# Patient Record
Sex: FEMALE | Race: WHITE | NOT HISPANIC OR LATINO | Employment: FULL TIME | URBAN - METROPOLITAN AREA
[De-identification: names, ages, dates, MRNs, and addresses within clinical notes are randomized per-mention and may not be internally consistent; named-entity substitution may affect disease eponyms.]

---

## 2017-01-03 ENCOUNTER — HOSPITAL ENCOUNTER (OUTPATIENT)
Dept: MRI IMAGING | Facility: HOSPITAL | Age: 47
Discharge: HOME | End: 2017-01-03
Admitting: ORTHOPAEDIC SURGERY

## 2017-01-03 DIAGNOSIS — M25.562 LEFT KNEE PAIN, UNSPECIFIED CHRONICITY: ICD-10-CM

## 2017-01-03 PROCEDURE — 73721 MRI JNT OF LWR EXTRE W/O DYE: CPT

## 2017-01-18 ENCOUNTER — TELEPHONE (OUTPATIENT)
Dept: OBSTETRICS AND GYNECOLOGY | Facility: CLINIC | Age: 47
End: 2017-01-18

## 2017-01-18 RX ORDER — VALACYCLOVIR HYDROCHLORIDE 500 MG/1
500 TABLET, FILM COATED ORAL 2 TIMES DAILY
Qty: 6 TABLET | Refills: 5 | Status: SHIPPED | OUTPATIENT
Start: 2017-01-18 | End: 2019-12-19

## 2017-01-18 NOTE — TELEPHONE ENCOUNTER
----- Message from Jodi Finch sent at 1/18/2017 10:24 AM EST -----  Regarding: NEEDS REFILL  Contact: [phone]  [facility]    NEED REFILL    [facility]  DOWNSTAIRS    NOT PREGNANT      Pt advised of rx called in

## 2017-05-30 ENCOUNTER — APPOINTMENT (OUTPATIENT)
Dept: MAMMOGRAPHY | Facility: HOSPITAL | Age: 47
End: 2017-05-30
Attending: INTERNAL MEDICINE

## 2017-06-02 ENCOUNTER — APPOINTMENT (OUTPATIENT)
Dept: MAMMOGRAPHY | Facility: HOSPITAL | Age: 47
End: 2017-06-02
Attending: INTERNAL MEDICINE

## 2017-06-06 ENCOUNTER — APPOINTMENT (OUTPATIENT)
Dept: MAMMOGRAPHY | Facility: HOSPITAL | Age: 47
End: 2017-06-06
Attending: INTERNAL MEDICINE

## 2017-06-15 ENCOUNTER — TRANSCRIBE ORDERS (OUTPATIENT)
Dept: MAMMOGRAPHY | Facility: HOSPITAL | Age: 47
End: 2017-06-15

## 2017-06-15 ENCOUNTER — HOSPITAL ENCOUNTER (OUTPATIENT)
Dept: MAMMOGRAPHY | Facility: HOSPITAL | Age: 47
Discharge: HOME | End: 2017-06-15
Attending: INTERNAL MEDICINE | Admitting: INTERNAL MEDICINE

## 2017-06-15 DIAGNOSIS — R92.8 ABNORMAL MAMMOGRAM: ICD-10-CM

## 2017-06-15 DIAGNOSIS — R92.8 ABNORMAL MAMMOGRAM: Primary | ICD-10-CM

## 2017-06-15 PROCEDURE — 77065 DX MAMMO INCL CAD UNI: CPT | Performed by: RADIOLOGY

## 2017-06-15 PROCEDURE — G0206 DX MAMMO INCL CAD UNI: HCPCS

## 2017-12-18 ENCOUNTER — HOSPITAL ENCOUNTER (OUTPATIENT)
Dept: MAMMOGRAPHY | Facility: HOSPITAL | Age: 47
Discharge: HOME | End: 2017-12-18
Attending: INTERNAL MEDICINE | Admitting: INTERNAL MEDICINE

## 2017-12-18 DIAGNOSIS — R92.8 ABNORMAL MAMMOGRAM: ICD-10-CM

## 2017-12-18 PROCEDURE — 77066 DX MAMMO INCL CAD BI: CPT | Performed by: RADIOLOGY

## 2017-12-18 PROCEDURE — 77062 BREAST TOMOSYNTHESIS BI: CPT | Performed by: RADIOLOGY

## 2017-12-18 PROCEDURE — G0279 TOMOSYNTHESIS, MAMMO: HCPCS

## 2017-12-18 PROCEDURE — G0204 DX MAMMO INCL CAD BI: HCPCS

## 2019-04-16 PROBLEM — F32.1 CURRENT MODERATE EPISODE OF MAJOR DEPRESSIVE DISORDER WITHOUT PRIOR EPISODE: Status: ACTIVE | Noted: 2019-04-16

## 2019-04-16 PROBLEM — M50.90 DISC DISORDER OF CERVICAL REGION: Status: ACTIVE | Noted: 2019-04-16

## 2019-04-16 PROBLEM — Z78.0 MENOPAUSE: Status: ACTIVE | Noted: 2019-04-16

## 2019-04-16 PROBLEM — H92.01 EAR PAIN, RIGHT: Status: ACTIVE | Noted: 2019-04-16

## 2019-04-16 PROBLEM — M25.551 BILATERAL HIP PAIN: Status: ACTIVE | Noted: 2019-04-16

## 2019-04-16 PROBLEM — H61.21 IMPACTED CERUMEN OF RIGHT EAR: Status: ACTIVE | Noted: 2019-04-16

## 2019-04-16 PROBLEM — Z00.00 ANNUAL PHYSICAL EXAM: Status: ACTIVE | Noted: 2019-04-16

## 2019-04-16 PROBLEM — M54.42 ACUTE LEFT-SIDED LOW BACK PAIN WITH LEFT-SIDED SCIATICA: Status: ACTIVE | Noted: 2019-04-16

## 2019-04-16 PROBLEM — M25.552 BILATERAL HIP PAIN: Status: ACTIVE | Noted: 2019-04-16

## 2019-04-16 PROBLEM — E66.9 SIMPLE OBESITY: Status: ACTIVE | Noted: 2019-04-16

## 2019-04-16 PROBLEM — B02.9 HERPES ZOSTER WITHOUT COMPLICATION: Status: ACTIVE | Noted: 2019-04-16

## 2019-04-16 PROBLEM — J30.9 ALLERGIC RHINITIS: Status: ACTIVE | Noted: 2019-04-16

## 2019-04-25 ENCOUNTER — OFFICE VISIT (OUTPATIENT)
Dept: INTERNAL MEDICINE | Facility: CLINIC | Age: 49
End: 2019-04-25

## 2019-04-25 VITALS
HEIGHT: 64 IN | WEIGHT: 210.5 LBS | HEART RATE: 68 BPM | BODY MASS INDEX: 35.94 KG/M2 | DIASTOLIC BLOOD PRESSURE: 88 MMHG | SYSTOLIC BLOOD PRESSURE: 128 MMHG

## 2019-04-25 DIAGNOSIS — F32.5 MAJOR DEPRESSIVE DISORDER WITH SINGLE EPISODE, IN FULL REMISSION (HCC): Primary | ICD-10-CM

## 2019-04-25 DIAGNOSIS — E66.9 SIMPLE OBESITY: ICD-10-CM

## 2019-04-25 PROCEDURE — 99213 OFFICE O/P EST LOW 20 MIN: CPT | Performed by: INTERNAL MEDICINE

## 2019-04-25 NOTE — PROGRESS NOTES
[facility]     Blanka Carter  1970   [id number]      Patient Care Team:  Slava Hair MD as PCP - General  Slava Hair MD as PCP - Family Medicine    Chief Complaint::   Chief Complaint   Patient presents with   • Depression   • Allergic Rhinitis   • Back Pain   • Menopause        HPI  Mrs. Carter comes in for follow-up of her depression, back pain and obesity.  Her back pain is generally resolved.  She is back to the gym lifting weights and doing cardio.  Her depression is also largely resolved.  She is now taking Pristiq every other day.  She has long since been off escitalopram.  Her only other complaint is numbness in her hands when she wakes up in the morning.  She thinks it may be the way she sleeps because by 11 AM the symptoms have resolved completely.    Chronic Conditions:      Patient Active Problem List   Diagnosis   • Herpes zoster without complication   • Acute left-sided low back pain with left-sided sciatica   • Disc disorder of cervical region   • Simple obesity   • Menopause   • Impacted cerumen of right ear   • Allergic rhinitis   • Ear pain, right   • Annual physical exam   • Bilateral hip pain   • Current moderate episode of major depressive disorder without prior episode (CMS/HCC)        Past Medical History:   Diagnosis Date   • Cervical spondylosis    • HSV infection    • Seizure disorder (CMS/HCC)     resolved       Past Surgical History:   Procedure Laterality Date   •  SECTION     • HYSTERECTOMY      LSH   • PARTIAL HYSTERECTOMY     • TUBAL ABDOMINAL LIGATION         Family History   Problem Relation Age of Onset   • Diabetes Father         type 2   • Osteoarthritis Mother    • Breast cancer Neg Hx    • Ovarian cancer Neg Hx        Social History     Socioeconomic History   • Marital status:      Spouse name: Not on file   • Number of children: Not on file   • Years of education: Not on file   • Highest education level: Not on  file   Tobacco Use   • Smoking status: Former Smoker     Years: 14.00     Types: Cigarettes     Last attempt to quit: 2012     Years since quittin.3   • Tobacco comment: 2 cigarettes per day       No Known Allergies      Current Outpatient Medications:   •  acyclovir (ZOVIRAX) 400 MG tablet, Take 1 tablet by mouth 2 (Two) Times a Day., Disp: 60 tablet, Rfl: 5  •  cetirizine-pseudoephedrine (ZyrTEC-D) 5-120 MG per 12 hr tablet, Take 1 tablet by mouth Every 12 (Twelve) Hours., Disp: 60 tablet, Rfl: 3  •  cyclobenzaprine (FLEXERIL) 5 MG tablet, Take 1-2 tablets by mouth every night at bedtime., Disp: 30 tablet, Rfl: 1  •  desvenlafaxine (PRISTIQ) 50 MG 24 hr tablet, Take 1 tablet by mouth every other day, Disp: 15 tablet, Rfl: 3  •  albuterol (PROVENTIL HFA;VENTOLIN HFA) 108 (90 Base) MCG/ACT inhaler, Inhale 1-2 puffs Every 6 (Six) Hours As Needed., Disp: 18 g, Rfl: 0  •  hepatitis A (HAVRIX) 1440 EL U/ML vaccine, Inject into the appropriate muscle as directed by prescriber. Repeat in 6 months., Disp: 1 mL, Rfl: 1  •  valACYclovir (VALTREX) 500 MG tablet, Take 1 tablet by mouth 2 (Two) Times a Day., Disp: 6 tablet, Rfl: 5  •  valACYclovir (VALTREX) 500 MG tablet, Take 1 tablet by mouth 2 (Two) Times a Day., Disp: 20 tablet, Rfl: 0    Review of Systems   Constitutional: Negative for chills, fatigue and fever.   HENT: Negative for congestion, ear pain and sinus pressure.    Respiratory: Negative for cough, chest tightness, shortness of breath and wheezing.    Cardiovascular: Negative for chest pain and palpitations.   Gastrointestinal: Negative for abdominal pain, blood in stool and constipation.   Skin: Negative for color change.   Allergic/Immunologic: Negative for environmental allergies.   Neurological: Negative for dizziness, speech difficulty and headache.   Psychiatric/Behavioral: Negative for decreased concentration. The patient is not nervous/anxious.         Vital Signs  Vitals:    19 0853   BP:  "128/88   BP Location: Right arm   Patient Position: Sitting   Cuff Size: Adult   Pulse: 68   Weight: 95.5 kg (210 lb 8 oz)   Height: 162.6 cm (64.02\")       Physical Exam   Constitutional: She is oriented to person, place, and time. She appears well-developed and well-nourished.   HENT:   Head: Normocephalic and atraumatic.   Cardiovascular: Normal rate, regular rhythm and normal heart sounds.   No murmur heard.  Pulmonary/Chest: Effort normal and breath sounds normal.   Neurological: She is alert and oriented to person, place, and time.   Psychiatric: She has a normal mood and affect.   Vitals reviewed.     Procedures    ACE III MINI             Assessment/Plan:    Blanka was seen today for depression, allergic rhinitis, back pain and menopause.    Diagnoses and all orders for this visit:    Major depressive disorder with single episode, in full remission (CMS/HCC)    Simple obesity    She will further taper Pristiq by reducing frequency to every third day.  After 2 to 3 weeks, if there is no evidence of recurrence, suggest she discontinue the drug completely at that point.    Discussed the importance of reducing carbohydrates and calories in her diet and to continue exercise.      Plan of care reviewed with patient at the conclusion of today's visit. Education was provided regarding diagnosis, management, and any prescribed or recommended OTC medications.Patient verbalizes understanding of and agreement with management plan.         Slava Hair MD           "

## 2019-06-17 RX ORDER — CETIRIZINE HYDROCHLORIDE, PSEUDOEPHEDRINE HYDROCHLORIDE 5; 120 MG/1; MG/1
1 TABLET, FILM COATED, EXTENDED RELEASE ORAL EVERY 12 HOURS
Qty: 60 TABLET | Refills: 3 | Status: SHIPPED | OUTPATIENT
Start: 2019-06-17 | End: 2020-11-11

## 2019-07-05 ENCOUNTER — TELEPHONE (OUTPATIENT)
Dept: INTERNAL MEDICINE | Facility: CLINIC | Age: 49
End: 2019-07-05

## 2019-07-25 ENCOUNTER — OFFICE VISIT (OUTPATIENT)
Dept: INTERNAL MEDICINE | Facility: CLINIC | Age: 49
End: 2019-07-25

## 2019-07-25 VITALS — SYSTOLIC BLOOD PRESSURE: 122 MMHG | HEART RATE: 76 BPM | DIASTOLIC BLOOD PRESSURE: 80 MMHG

## 2019-07-25 DIAGNOSIS — M79.672 PAIN OF BOTH HEELS: Primary | ICD-10-CM

## 2019-07-25 DIAGNOSIS — M79.671 PAIN OF BOTH HEELS: Primary | ICD-10-CM

## 2019-07-25 PROCEDURE — 99213 OFFICE O/P EST LOW 20 MIN: CPT | Performed by: PHYSICIAN ASSISTANT

## 2019-07-25 NOTE — PROGRESS NOTES
Patient Care Team:  Slava Hair MD as PCP - General  Slava Hair MD as PCP - Family Medicine    Chief Complaint;:   Chief Complaint   Patient presents with   • Foot Pain     bilateral but mosty R        Subjective     HPI  49-year-old white female presents to the office today with bilateral heel pain the right is much worse than the left.  She has a prior history of left foot plantar fasciitis but this pain is more localized in the heel area.  She is on her feet all day.  She does wear shoe inserts.  She does do her stretching exercises.  She takes Aleve which does not give her much relief.  She is tried icing which makes symptoms worse.  Past Medical History:   Diagnosis Date   • Cervical spondylosis    • HSV infection    • Seizure disorder (CMS/[facility])     resolved       Social History     Socioeconomic History   • Marital status:      Spouse name: Not on file   • Number of children: Not on file   • Years of education: Not on file   • Highest education level: Not on file   Tobacco Use   • Smoking status: Former Smoker     Years: 14.00     Types: Cigarettes     Last attempt to quit: 2012     Years since quittin.5   • Tobacco comment: 2 cigarettes per day       No Known Allergies    Review of Systems:     Review of Systems   Musculoskeletal:        Bilateral heel pain       Vital Signs  Vitals:    19 1513   BP: 122/80   BP Location: Left arm   Patient Position: Sitting   Cuff Size: Adult   Pulse: 76   Weight: Comment: patient refused   PainSc:   4   PainLoc: Foot         Current Outpatient Medications:   •  acyclovir (ZOVIRAX) 400 MG tablet, Take 1 tablet by mouth 2 (Two) Times a Day., Disp: 60 tablet, Rfl: 5  •  cetirizine-pseudoephedrine (ZyrTEC-D) 5-120 MG per 12 hr tablet, Take 1 tablet by mouth Every 12 (Twelve) Hours., Disp: 60 tablet, Rfl: 3  •  cyclobenzaprine (FLEXERIL) 5 MG tablet, Take 1-2 tablets by mouth every night at bedtime., Disp: 30 tablet, Rfl: 1  •   albuterol (PROVENTIL HFA;VENTOLIN HFA) 108 (90 Base) MCG/ACT inhaler, Inhale 1-2 puffs Every 6 (Six) Hours As Needed., Disp: 18 g, Rfl: 0  •  desvenlafaxine (PRISTIQ) 50 MG 24 hr tablet, Take 1 tablet by mouth every other day, Disp: 15 tablet, Rfl: 3  •  valACYclovir (VALTREX) 500 MG tablet, Take 1 tablet by mouth 2 (Two) Times a Day., Disp: 6 tablet, Rfl: 5  •  valACYclovir (VALTREX) 500 MG tablet, Take 1 tablet by mouth 2 (Two) Times a Day., Disp: 20 tablet, Rfl: 0    Physical Exam:    Physical Exam   Constitutional: She is oriented to person, place, and time. She appears well-developed and well-nourished.   Musculoskeletal:   Right heel tenderness no redness or swelling.   Neurological: She is alert and oriented to person, place, and time.   Nursing note and vitals reviewed.      Procedures      Assessment/Plan   Problem List Items Addressed This Visit     None      Visit Diagnoses     Pain of both heels    -  Primary    Referred to Dr. Joshua Solares    Relevant Orders    Ambulatory Referral to Podiatry (Completed)        Patient Instructions   Continue foot stretches.  Continue Aleve as needed.  Refer to podiatry.      Plan of care reviewed with patient at the conclusion of today's visit. Education was provided regarding diagnosis, management, and any prescribed or recommended OTC medications.Patient verbalizes understanding of and agreement with management plan.     Maylin Perez PA-C

## 2019-09-30 ENCOUNTER — OFFICE VISIT (OUTPATIENT)
Dept: OBSTETRICS AND GYNECOLOGY | Facility: CLINIC | Age: 49
End: 2019-09-30

## 2019-09-30 VITALS
WEIGHT: 218 LBS | SYSTOLIC BLOOD PRESSURE: 120 MMHG | DIASTOLIC BLOOD PRESSURE: 78 MMHG | HEIGHT: 64 IN | BODY MASS INDEX: 37.22 KG/M2

## 2019-09-30 DIAGNOSIS — Z71.89 COUNSELING FOR HORMONE REPLACEMENT THERAPY: ICD-10-CM

## 2019-09-30 DIAGNOSIS — E66.9 SIMPLE OBESITY: ICD-10-CM

## 2019-09-30 DIAGNOSIS — Z12.39 BREAST SCREENING: ICD-10-CM

## 2019-09-30 DIAGNOSIS — N95.1 MENOPAUSAL SYMPTOMS: ICD-10-CM

## 2019-09-30 DIAGNOSIS — Z01.419 WOMEN'S ANNUAL ROUTINE GYNECOLOGICAL EXAMINATION: Primary | ICD-10-CM

## 2019-09-30 PROCEDURE — 99386 PREV VISIT NEW AGE 40-64: CPT | Performed by: OBSTETRICS & GYNECOLOGY

## 2019-09-30 PROCEDURE — 99213 OFFICE O/P EST LOW 20 MIN: CPT | Performed by: OBSTETRICS & GYNECOLOGY

## 2019-09-30 NOTE — PROGRESS NOTES
Subjective     Chief Complaint   Patient presents with   • Gynecologic Exam     new patient established last seen  thinks she starting menopause complaints of night sweats weight gain irritability and loss of motivation duration 2 years       Blanka Carter is a 49 y.o. year old  presenting to be seen for annual exam and a problem. She reports troublesome menopausal symtpoms having had a hysterectomy about 5 years ago with ovarian preservation.      History of Present Illness  Location:  Hot flushing over entire body. Some vaginal symptoms as well  Signs/Symptoms:  Hot flushing with sweating. Vaginal dryness and pain eith intercourse leading to the avoidance of sexual intimacy  Duration:  Several years.  Severity:  Moderate to severe  Timing:  Almost daily/nightly. Frequently awaked twice. Difficulty falling back asleep;  Modifying Factors:  She is unaware of mitigating factors  She is chronically tired which she attributes to lack of good quality sleep. She has loss of interest in some normal activities. She has gained weight and not motivated to exercise. She is irritable. SSRI like medication was tried and failed to achieve any goals with mood.  She is interested in ERT  We discussed the short and long term risk of ERT sep DVT and Breast Cancer. We discussed the history of the prescribing of ERT and the anticipated therapeutic goals of ERT in the context of a prior hysterectomy. We discussed other health benefits besides the alleviation of vasomotor symptoms. We discussed the anticipated time course of response to the medication. We discussed methods of delivery of estrogen and my favoring initially the use of oral CEE.  Questions were answered. She has no contraindication to the use of Estrogens.    Past Medical History:   Diagnosis Date   • Cervical spondylosis    • HSV infection    • Seizure disorder (CMS/[facility])     resolved      Family History   Problem Relation Age of Onset   • Diabetes Father          type 2   • Osteoarthritis Mother    • Breast cancer Neg Hx    • Ovarian cancer Neg Hx       Social History     Socioeconomic History   • Marital status:      Spouse name: Not on file   • Number of children: Not on file   • Years of education: Not on file   • Highest education level: Not on file   Tobacco Use   • Smoking status: Former Smoker     Years: 14.00     Types: Cigarettes     Last attempt to quit: 2012     Years since quittin.7   • Tobacco comment: 2 cigarettes per day   Substance and Sexual Activity   • Alcohol use: Yes   • Drug use: No   • Sexual activity: Yes     Partners: Male        Her LMP was Patient's last menstrual period was 2013..  Her cycles are absent.     She is sexually active.  In the past 12 months there have not been new sexual partners.  Condoms are not typically used.  She would not like to be screened for STD's at today's exam.     Current contraceptive methods being used: status post hysterectomy.      She exercises regularly: no.  She wears her seat belt: yes.  She has concerns about domestic violence: no.    GYN screening history:  · Last pap: she reports her last PAP was normal  · Last mammogram: she reports her last mammogram was normal  · Last colonoscopy: diverticulosis, 2019  · Last DEXA: she has never had a DEXA.    Additional Complaints:  no other complaints at this time    The following portions of the patient's history were reviewed and updated as appropriate:  .    Review of Systems  A 14 point review of systems was negative except for: Constitutional: positive for weight gain  Eyes: positive for none  Ears, nose, mouth, throat, and face: positive for none  Respiratory: positive for none  Cardiovascular: positive for none  Gastrointestinal: positive for none  Genitourinary: positive for vaginal dryness painful intercourse  Integument/breast: positive for some breast tenderness at bra line  Hematologic/lymphatic: positive for none  Musculoskeletal:  "positive for none  Neurological: positive for none  Behavioral/Psych: positive for bad mood, obesity, sexual difficulty and sleep disturbance  Endocrine: positive for none  Allergic/Immunologic: positive for none    Objective   /78   Ht 162.6 cm (64\")   Wt 98.9 kg (218 lb)   LMP 12/01/2013 Comment: hysterectomy  Breastfeeding? No   BMI 37.42 kg/m²   Physical Exam  General:  obese - Body mass index is 37.42 kg/m².   Constitutional: obese and healthy   Skin:  No suspicious lesions seen   Thyroid: normal to inspection and palpation   Lungs:  breathing is unlabored  clear to auscultation bilaterally   Heart:  regular rate and rhythm, S1, S2 normal, no murmur, click, rub or gallop   Breasts:  Examined in supine position  Symmetric without masses or skin dimpling  Nipples normal without inversion, lesions or discharge  There are no palpable axillary nodes   Abdomen: soft, non-tender; no masses  no umbilical or inginual hernias are present  no hepato-splenomegaly   Pelvis: Clinical staff was present for exam  External genitalia:  normal appearance of the external genitalia including Bartholin's and Bellevue's glands.  :  urethral meatus normal; urethral hypermobility is absent.  Vaginal:  normal pink mucosa without prolapse or lesions.Atrophic changes noted  Cervix:  normal appearance pap done  Uterus:  absent  Adnexa:  normal bimanual exam of the adnexa.   Musculoskeletal: negative   Neuro: normal without focal findings, mental status, speech normal, alert and oriented x3 and LOBO   Psych: oriented to time, place and person, mood and affect are within normal limits, pt is a good historian; no memory problems were noted       Lab Review   No data reviewed    Imaging  Mammogram report    Assessment/Plan   Diagnosis:  1. Women's annual routine gynecological examination    2. Simple obesity   Discussed weight loss strategies   3. Menopausal symptoms   In addition to the annual GYN exam an additional 45 mins  Face " to face encounter was used with 40 mions devoted to the management of menopausal symptoms   She is interested in ERT  We discussed the short and long term risk of ERT sep DVT and Breast Cancer. We discussed the history of the prescribing of ERT and the anticipated therapeutic goals of ERT in the context of a prior hysterectomy. We discussed other health benefits besides the alleviation of vasomotor symptoms. We discussed the anticipated time course of response to the medication. We discussed methods of delivery of estrogen and my favoring initially the use of oral CEE.  Questions were answered. She has no contraindication to the use of Estrogens.   4. Counseling for hormone replacement therapy    5. Breast screening   Mamm to be scheduled  SBE re-inforced       Orders Placed This Encounter   Procedures   • Mammo Screening Digital Tomosynthesis Bilateral With CAD     Standing Status:   Future     Standing Expiration Date:   9/29/2020     Order Specific Question:   Reason for Exam:     Answer:   screen     New Medications Ordered This Visit   Medications   • estrogens, conjugated, (PREMARIN) 0.625 MG tablet     Sig: Take 1 tablet by mouth Daily.     Dispense:  30 tablet     Refill:  1   • estrogens, conjugated, (PREMARIN) 0.625 MG tablet     Sig: Take 1 tablet by mouth Daily.     Dispense:  5 tablet     Refill:  0       Follow up: 4 week(s)         This note was electronically signed.    Jack Rodney MD  September 30, 2019

## 2019-11-25 ENCOUNTER — OFFICE VISIT (OUTPATIENT)
Dept: OBSTETRICS AND GYNECOLOGY | Facility: CLINIC | Age: 49
End: 2019-11-25

## 2019-11-25 DIAGNOSIS — Z71.89 COUNSELING FOR HORMONE REPLACEMENT THERAPY: Primary | ICD-10-CM

## 2019-12-19 ENCOUNTER — OFFICE VISIT (OUTPATIENT)
Dept: OBSTETRICS AND GYNECOLOGY | Facility: CLINIC | Age: 49
End: 2019-12-19

## 2019-12-19 VITALS
WEIGHT: 222 LBS | HEIGHT: 64 IN | SYSTOLIC BLOOD PRESSURE: 120 MMHG | BODY MASS INDEX: 37.9 KG/M2 | DIASTOLIC BLOOD PRESSURE: 66 MMHG

## 2019-12-19 DIAGNOSIS — Z71.89 COUNSELING FOR HORMONE REPLACEMENT THERAPY: Primary | ICD-10-CM

## 2019-12-19 DIAGNOSIS — N95.1 MENOPAUSAL SYMPTOMS: ICD-10-CM

## 2019-12-19 PROCEDURE — 99213 OFFICE O/P EST LOW 20 MIN: CPT | Performed by: OBSTETRICS & GYNECOLOGY

## 2019-12-19 NOTE — PROGRESS NOTES
Chief complaint:   Follow-up  History of present illness:  This patient is a 49-year-old female being seen in follow-up for hormone replacement therapy.  She was begun on estrogen having complained of vasomotor symptoms as well as vaginal complaints.  Having been on estrogen replacement therapy for approximately 4 to 6 weeks she reports that all hot flashes and night sweats have resolved.  She is satisfied with her treatment.  She is having no worrisome side effects from estrogen.  She is compliant with her medication.  She reports an increase in irritability which she has suffered from in the past and is not necessarily attributing the mood issues to the estrogen.  Review of systems:  14 point reviewed otherwise negative  Vital signs:  Reviewed  Physical exam:  Not performed  Impression:  Menopausal symptoms, resolved  Mood disorder  Plan:  Continue Premarin 0.625 mg daily  Follow-up for annual exam  Discuss with me or family medical doctor issues with mood if persistent

## 2020-04-07 RX ORDER — ACYCLOVIR 400 MG/1
400 TABLET ORAL 2 TIMES DAILY
Qty: 60 TABLET | Refills: 5 | Status: SHIPPED | OUTPATIENT
Start: 2020-04-07 | End: 2021-02-18 | Stop reason: ALTCHOICE

## 2020-04-07 NOTE — TELEPHONE ENCOUNTER
Electronic refill request for acyclovir.  Patient's last OV was with Maylin on 7/25/20.  No future appt scheduled.  Provider sign-off pending.

## 2020-09-11 ENCOUNTER — OFFICE VISIT (OUTPATIENT)
Dept: ORTHOPEDIC SURGERY | Facility: CLINIC | Age: 50
End: 2020-09-11

## 2020-09-11 VITALS — HEART RATE: 83 BPM | BODY MASS INDEX: 37.22 KG/M2 | HEIGHT: 64 IN | WEIGHT: 218 LBS | OXYGEN SATURATION: 97 %

## 2020-09-11 DIAGNOSIS — M72.2 PLANTAR FASCIITIS OF RIGHT FOOT: Primary | ICD-10-CM

## 2020-09-11 PROCEDURE — 99213 OFFICE O/P EST LOW 20 MIN: CPT | Performed by: PHYSICIAN ASSISTANT

## 2020-09-11 RX ORDER — IBUPROFEN 200 MG
200 TABLET ORAL EVERY 6 HOURS PRN
COMMUNITY
End: 2021-03-31

## 2020-09-11 NOTE — PROGRESS NOTES
[facility] [place] [facility] Note    Subjective     Patient: Blanka Carter  : 1970    Primary Care Provider: Slava Hair MD    Requesting Provider: As above    Pain of the Right Foot      History    Chief Complaint: Right heel pain    History of Present Illness: This is a very pleasant 50-year-old female presenting today to discuss her right heel pain.  She complains of pain for approximately 2 years.  No trauma or injury.  She has pain that is worse with weightbearing and walking with occasional shooting rest pain as well.  She it is worse first thing in the morning getting up from a seated position.  She has treated by podiatry with multiple cortisone injections, laser therapy, several rounds of physical therapy, [place] sock, orthotics with persisting pain is dysfunction.  She reports it is sharp and 3/10.  No radiating pain.  She is here for evaluation and treatment recommendations with concerns that this is something other than plantar fasciitis    Current Outpatient Medications on File Prior to Visit   Medication Sig Dispense Refill   • acyclovir (ZOVIRAX) 400 MG tablet Take 1 tablet by mouth 2 (Two) Times a Day. 60 tablet 5   • cetirizine-pseudoephedrine (ZyrTEC-D) 5-120 MG per 12 hr tablet Take 1 tablet by mouth Every 12 (Twelve) Hours. 60 tablet 3   • estrogens, conjugated, (Premarin) 0.625 MG tablet Take 1 tablet by mouth Daily. 90 tablet 2   • ibuprofen (ADVIL,MOTRIN) 200 MG tablet Take 200 mg by mouth Every 6 (Six) Hours As Needed for Mild Pain .       No current facility-administered medications on file prior to visit.       No Known Allergies   Past Medical History:   Diagnosis Date   • Cervical spondylosis    • HSV infection    • Seizure disorder (CMS/HCC)     resolved     Past Surgical History:   Procedure Laterality Date   •  SECTION     • HYSTERECTOMY      LSH   • SUBTOTAL HYSTERECTOMY     • TUBAL ABDOMINAL LIGATION       Family History   Problem  "Relation Age of Onset   • Diabetes Father         type 2   • Osteoarthritis Mother    • Breast cancer Neg Hx    • Ovarian cancer Neg Hx       Social History     Socioeconomic History   • Marital status:      Spouse name: Not on file   • Number of children: Not on file   • Years of education: Not on file   • Highest education level: Not on file   Tobacco Use   • Smoking status: Former Smoker     Years: 14.00     Types: Cigarettes     Quit date: 2012     Years since quittin.7   • Smokeless tobacco: Never Used   • Tobacco comment: 2 cigarettes per day   Substance and Sexual Activity   • Alcohol use: Yes   • Drug use: No   • Sexual activity: Yes     Partners: Male        Review of Systems   Constitutional: Positive for activity change and fatigue.   HENT: Positive for congestion and sinus pressure.    Eyes: Negative.    Respiratory: Negative.    Cardiovascular: Negative.    Gastrointestinal: Negative.    Endocrine: Negative.    Genitourinary: Negative.    Musculoskeletal: Positive for arthralgias.   Skin: Negative.    Allergic/Immunologic: Positive for environmental allergies.   Neurological: Positive for headaches.   Hematological: Negative.    Psychiatric/Behavioral: Negative.        The following portions of the patient's history were reviewed and updated as appropriate: allergies, current medications, past family history, past medical history, past social history, past surgical history and problem list.      Objective      Physical Exam  Pulse 83   Ht 162.6 cm (64.02\")   Wt 98.9 kg (218 lb)   LMP 2013 Comment: hysterectomy  SpO2 97%   BMI 37.40 kg/m²     Body mass index is 37.4 kg/m².    GENERAL: Body habitus: obese    Lower extremity edema: Left: 1+ pitting; Right: 1+ pitting    Varicose veins:  Left: mild; Right: mild    Gait: normal and antalgic with the first few steps     Mental Status:  awake and alert; oriented to person, place, and time    Voice:  clear  SKIN:  Normal    Hair " Growth:  Right:normal; Left:  normal  HEENT: Head: Normocephalic, atraumatic,  without obvious abnormality.  eye: normal external eye, no icterus   PULM:  Repiratory effort normal    Ortho Exam  V:  Dorsalis Pedis:  Right: 2+; Left:2+    Posterior Tibial: Right:2+; Left:2+    Capillary Refill:  Brisk  MSK:      Tibia:  Right:  tender over subcutaneous border; Left:  tender over subcutaneous border      Ankle:  Right: tender Very mildly tender over the insertion of the Achilles tendon, ROM  normal and motor function  normal; Left:  non tender, ROM  normal and motor function  normal      Foot:  Right:  tender Over the origin the plantar fascia and into the arch with palpable plantar fibroma in the arch; Left:  non tender      NEURO: Heel Walking:  Right:  painful; Left:  normal    Toe Walking:  Right:  normal; Left:  normal     [place]-Gilbert 5.07 monofilament test: not evaluated    Lower extremity sensation: intact     Calf Atrophy:none    Motor Function: all 5/5          Medical Decision Making    Data Review:   ordered and reviewed x-rays today and reviewed outside records    Assessment:  1. Plantar fasciitis of right foot        Plan:  Right plantar fasciitis: Patient has been treated in the past with injections, laser therapy, physical therapy and stretching as well as Strassburg sock with persisting pain and dysfunction.  I reassured her that this is plantar fasciitis.  Her x-rays show some mild hallux rigidus with no evidence of any acute bony findings.  I explained plantar fasciitis in detail to the patient.  I explained to the bow-string mechanism of the plantar fascia.  I went over the current research of the [employer] with them.  I explained the treatments that were not statistically significant in improving the problem including: injection of steroids, special orthotics, special shoes, surgery, medication, ice, not working, etc.    I explained the treatments that are  "statistically significant at improving the problem.  These include stretching/night splinting, casting, PRP.    I explained the most important treatment: Stretching and night splinting.  I went over the patient information sheet with them, I showed them the stretches and they were able to reproduce them.  I emphasized the \"toe pull\" as the most important stretch.  They need to do the stretches 5 repetitions each, 6-8 times per day.  I explained how to do them at work, at home, before getting out of bed, before getting out of the car, and before getting up from a chair.    We provided them with a night splint.    If they do not improve after 4 months of aggressive stretching and night splinting, the next step will be a short leg fiberglass walking cast worn for 6 weeks.    · Preprinted education was provided to the patient.    We discussed further treatment including casting versus stretching and night splinting.  We will get her a better night splint today.  Patient will begin stretching and night splinting return for casting if needed.    Patient history, diagnosis and treatment plan discussed with Dr. Berman.          .        Blanka Braun PA-C  09/15/20  11:31 EDT  "

## 2020-09-15 ENCOUNTER — TELEPHONE (OUTPATIENT)
Dept: ORTHOPEDIC SURGERY | Facility: CLINIC | Age: 50
End: 2020-09-15

## 2020-09-15 NOTE — TELEPHONE ENCOUNTER
Called patient back- she wants to come in to talk with Blanka about her foot. .She feels like something different might be going on.     Deyanira

## 2020-09-15 NOTE — TELEPHONE ENCOUNTER
The next step would be to be in a weightbearing cast for 6 weeks.  This is a fiberglass cast as he broke her leg but he can walk on it.  Understand, it is your right side he would be unable to drive in the cast.  Following the cast, we would not expect you to be 100% better but then you would return to stretching and night splinting.  This would also help the pain in your ankle.  We are happy to cast you at any time should you decide to do that since you have been dealing with this for so long.

## 2020-09-15 NOTE — TELEPHONE ENCOUNTER
----- Message from Blanka Carter sent at 9/15/2020 11:56 AM EDT -----  Regarding: Non-Urgent Medical Question  Contact: [phone]  Blanka,   After the 4 months of continuing stretching that I’ve already been doing for a year as well as the ice packs.. what is the next step??  When will the pain go away so that I can get back to my normal activities without having pain. And when will I be able to wear shoes other that tennis shoes with orthotics, and not have pain?  Will the pain in my ankle subside as well??    Sorry for all the questions,  Blanka

## 2020-09-23 ENCOUNTER — OFFICE VISIT (OUTPATIENT)
Dept: ORTHOPEDIC SURGERY | Facility: CLINIC | Age: 50
End: 2020-09-23

## 2020-09-23 VITALS — BODY MASS INDEX: 37.22 KG/M2 | OXYGEN SATURATION: 98 % | WEIGHT: 218.03 LBS | HEIGHT: 64 IN | HEART RATE: 77 BPM

## 2020-09-23 DIAGNOSIS — M25.50 MULTIPLE JOINT PAIN: ICD-10-CM

## 2020-09-23 DIAGNOSIS — M72.2 PLANTAR FASCIITIS OF RIGHT FOOT: ICD-10-CM

## 2020-09-23 DIAGNOSIS — M25.60 MORNING STIFFNESS OF JOINTS: Primary | ICD-10-CM

## 2020-09-23 PROCEDURE — 99213 OFFICE O/P EST LOW 20 MIN: CPT | Performed by: PHYSICIAN ASSISTANT

## 2020-09-23 NOTE — PROGRESS NOTES
[facility] [place] [facility] Note    Subjective     Patient: Blanka Carter  : 1970    Primary Care Provider: Slava Hair MD    Requesting Provider: As above    Follow-up (1.5 week f/u--right foot and ankle)      History    Chief Complaint: Right foot pain and multiple joint stiffness    History of Present Illness: Patient returns today with concerns that something more is going on than just plantar fasciitis.  She reports that for quite some time she has had stiffness in multiple joints in the morning as well is aches and pains in multiple joints.  She does report that the night splinting and stretching have improved her plantar fasciitis somewhat.  She is concerned because she has been dealing with both a plantar fasciitis for so long without any relief.  She has no known history of any inflammatory arthritis in her family.  She is here to see what other options are available for her.    Current Outpatient Medications on File Prior to Visit   Medication Sig Dispense Refill   • acyclovir (ZOVIRAX) 400 MG tablet Take 1 tablet by mouth 2 (Two) Times a Day. 60 tablet 5   • cetirizine-pseudoephedrine (ZyrTEC-D) 5-120 MG per 12 hr tablet Take 1 tablet by mouth Every 12 (Twelve) Hours. 60 tablet 3   • estrogens, conjugated, (Premarin) 0.625 MG tablet Take 1 tablet by mouth Daily. 90 tablet 2   • ibuprofen (ADVIL,MOTRIN) 200 MG tablet Take 200 mg by mouth Every 6 (Six) Hours As Needed for Mild Pain .       No current facility-administered medications on file prior to visit.       No Known Allergies   Past Medical History:   Diagnosis Date   • Cervical spondylosis    • HSV infection    • Seizure disorder (CMS/[facility])     resolved     Past Surgical History:   Procedure Laterality Date   •  SECTION     • HYSTERECTOMY      LSH   • SUBTOTAL HYSTERECTOMY     • TUBAL ABDOMINAL LIGATION       Family History   Problem Relation Age of Onset   • Diabetes Father         type 2   •  "Osteoarthritis Mother    • Breast cancer Neg Hx    • Ovarian cancer Neg Hx       Social History     Socioeconomic History   • Marital status:      Spouse name: Not on file   • Number of children: Not on file   • Years of education: Not on file   • Highest education level: Not on file   Tobacco Use   • Smoking status: Former Smoker     Years: 14.00     Types: Cigarettes     Quit date: 2012     Years since quittin.7   • Smokeless tobacco: Never Used   • Tobacco comment: 2 cigarettes per day   Substance and Sexual Activity   • Alcohol use: Yes   • Drug use: No   • Sexual activity: Yes     Partners: Male        Review of Systems   Constitutional: Negative.    HENT: Negative.    Eyes: Negative.    Respiratory: Negative.    Cardiovascular: Negative.    Gastrointestinal: Negative.    Endocrine: Negative.    Genitourinary: Negative.    Musculoskeletal: Positive for arthralgias.   Skin: Negative.    Allergic/Immunologic: Negative.    Neurological: Negative.    Hematological: Negative.    Psychiatric/Behavioral: Negative.        The following portions of the patient's history were reviewed and updated as appropriate: allergies, current medications, past family history, past medical history, past social history, past surgical history and problem list.      Objective      Physical Exam  Pulse 77   Ht 162.6 cm (64.02\")   Wt 98.9 kg (218 lb 0.6 oz)   LMP 2013 Comment: hysterectomy  SpO2 98%   BMI 37.41 kg/m²     Body mass index is 37.41 kg/m².    Patient is well developed, well nourished and in no acute distress.  Alert and oriented x 3.    Ortho Exam  V:  Dorsalis Pedis:  Right: 2+;     Posterior Tibial: Right:2+;     Capillary Refill:  Brisk  MSK:      Tibia:  Right:  non tender;    Ankle:  Right: non tender;     Foot:  Right:  tender Over the origin of the plantar fashion into the arch;       NEURO:     [place]Gil[place] 5.07 monofilament test: not evaluated    Lower extremity sensation: intact "     Calf Atrophy:none    Motor Function: all 5/5            Medical Decision Making    Data Review:   none    Assessment:  1. Morning stiffness of joints    2. Multiple joint pain    3. Plantar fasciitis of right foot        Plan:  Plantar fasciitis on the right with morning stiffness of multiple joints.  Patient reports that night splinting and stretching have mildly improved her pain over the past 2 weeks.  I explained to her that I am happy to put her in a weightbearing cast to speed the process, however, it is her right leg and she cannot drive.  She would like to continue with stretching and night splinting.  Given her multiple joint symptoms and morning stiffness, I would recommend referral to rheumatology.  I will put in that referral and she will return to see us as needed.      Blanka Braun PA-C  09/24/20  15:43 EDT

## 2020-10-21 ENCOUNTER — TRANSCRIBE ORDERS (OUTPATIENT)
Dept: INTERNAL MEDICINE | Facility: CLINIC | Age: 50
End: 2020-10-21

## 2020-10-21 DIAGNOSIS — Z12.31 VISIT FOR SCREENING MAMMOGRAM: Primary | ICD-10-CM

## 2020-11-11 ENCOUNTER — HOSPITAL ENCOUNTER (OUTPATIENT)
Dept: GENERAL RADIOLOGY | Facility: HOSPITAL | Age: 50
Discharge: HOME | End: 2020-11-11
Admitting: NURSE PRACTITIONER

## 2020-11-11 ENCOUNTER — OFFICE VISIT (OUTPATIENT)
Dept: INTERNAL MEDICINE | Facility: CLINIC | Age: 50
End: 2020-11-11

## 2020-11-11 VITALS
SYSTOLIC BLOOD PRESSURE: 122 MMHG | HEART RATE: 68 BPM | BODY MASS INDEX: 38.24 KG/M2 | DIASTOLIC BLOOD PRESSURE: 78 MMHG | HEIGHT: 64 IN | TEMPERATURE: 97.5 F | WEIGHT: 224 LBS

## 2020-11-11 DIAGNOSIS — M54.50 ACUTE RIGHT-SIDED LOW BACK PAIN WITHOUT SCIATICA: Primary | ICD-10-CM

## 2020-11-11 DIAGNOSIS — M54.50 ACUTE RIGHT-SIDED LOW BACK PAIN WITHOUT SCIATICA: ICD-10-CM

## 2020-11-11 LAB
BILIRUB BLD-MCNC: NEGATIVE MG/DL
CLARITY, POC: CLEAR
COLOR UR: YELLOW
GLUCOSE UR STRIP-MCNC: NEGATIVE MG/DL
KETONES UR QL: NEGATIVE
LEUKOCYTE EST, POC: NEGATIVE
NITRITE UR-MCNC: NEGATIVE MG/ML
PH UR: 5.5 [PH] (ref 5–8)
PROT UR STRIP-MCNC: NEGATIVE MG/DL
RBC # UR STRIP: NEGATIVE /UL
SP GR UR: 1.02 (ref 1–1.03)
UROBILINOGEN UR QL: NORMAL

## 2020-11-11 PROCEDURE — 72100 X-RAY EXAM L-S SPINE 2/3 VWS: CPT

## 2020-11-11 PROCEDURE — 99213 OFFICE O/P EST LOW 20 MIN: CPT | Performed by: NURSE PRACTITIONER

## 2020-11-11 PROCEDURE — 81003 URINALYSIS AUTO W/O SCOPE: CPT | Performed by: NURSE PRACTITIONER

## 2020-11-11 NOTE — PROGRESS NOTES
Blanka Carter  1970  [id number]  Patient Care Team:  Slava Hair MD as PCP - General  Slava Hair MD as PCP - Family Medicine    Blanka Carter is a pleasant 50 y.o. 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  Advil usually helps 400-600 BID, but not much this episode.    No urinary sx.  Kidney stone in past. No change in bm  Xray  showed some scoliotic curvature with convexity to the right  Past Medical History:   Diagnosis Date   • Cervical spondylosis    • HSV infection    • Seizure disorder (CMS/HCC)     resolved     Past Surgical History:   Procedure Laterality Date   •  SECTION     • HYSTERECTOMY      LSH   • SUBTOTAL HYSTERECTOMY     • TUBAL ABDOMINAL LIGATION       Family History   Problem Relation Age of Onset   • Diabetes Father         type 2   • Osteoarthritis Mother    • Breast cancer Neg Hx    • Ovarian cancer Neg Hx      Social History     Tobacco Use   Smoking Status Former Smoker   • Packs/day: 0.10   • Years: 14.00   • Pack years: 1.40   • Types: Cigarettes   • Start date:    • Quit date:    • Years since quittin.8   Smokeless Tobacco Never Used   Tobacco Comment    2 cigarettes per day     No Known Allergies    Current Outpatient Medications:   •  acyclovir (ZOVIRAX) 400 MG tablet, Take 1 tablet by mouth 2 (Two) Times a Day., Disp: 60 tablet, Rfl: 5  •  estrogens, conjugated, (PREMARIN) 0.625 MG tablet, Take 1 tablet by mouth Daily., Disp: 30 tablet, Rfl: 0  •  ibuprofen (ADVIL,MOTRIN) 200 MG tablet, Take 200 mg by mouth Every 6 (Six) Hours As Needed for Mild Pain ., Disp: , Rfl:     Review of Systems   Constitutional: Negative for chills, fatigue and fever.   HENT: Positive for ear pain and sinus pressure. Negative for congestion.    Respiratory: Negative for cough, chest  "tightness, shortness of breath and wheezing.    Cardiovascular: Negative for chest pain and palpitations.   Gastrointestinal: Negative for abdominal pain, blood in stool and constipation.   Skin: Negative for color change.   Allergic/Immunologic: Negative for environmental allergies.   Neurological: Negative for dizziness, speech difficulty and headache.   Psychiatric/Behavioral: Negative for decreased concentration. The patient is not nervous/anxious.      /78 (BP Location: Left arm, Patient Position: Sitting, Cuff Size: Large Adult)   Pulse 68   Temp 97.5 °F (36.4 °C) (Temporal)   Ht 162.6 cm (64.02\")   Wt 102 kg (224 lb)   LMP 12/01/2013 Comment: hysterectomy  BMI 38.43 kg/m²     Physical Exam  Vitals signs reviewed.   Constitutional:       Appearance: She is well-developed.   Pulmonary:      Effort: Pulmonary effort is normal.   Musculoskeletal:        Arms:    Skin:     General: Skin is warm and dry.   Neurological:      Mental Status: She is alert.   Psychiatric:         Behavior: Behavior normal.         Procedures    Results Review:  I reviewed the patient's new clinical results.    PHQ-9 Total Score: 1    Assessment/Plan:  Diagnoses and all orders for this visit:    1. Acute right-sided low back pain without sciatica (Primary)  -     POC Urinalysis Dipstick, Automated       There are no Patient Instructions on file for this visit.  Plan of care reviewed with patient at the conclusion of today's visit. Education was provided regarding diagnosis, management and any prescribed or recommended OTC medications.  Patient verbalizes understanding of and agreement with management plan.    Return if symptoms worsen or fail to improve.    *Note that portions of this note were completed with a voice recognition program.  Efforts were made to edit the dictation but occasionally words are transcribed.    ADRIEN Hammer          "

## 2020-11-19 ENCOUNTER — OFFICE VISIT (OUTPATIENT)
Dept: OBSTETRICS AND GYNECOLOGY | Facility: CLINIC | Age: 50
End: 2020-11-19

## 2020-11-19 VITALS
SYSTOLIC BLOOD PRESSURE: 120 MMHG | DIASTOLIC BLOOD PRESSURE: 90 MMHG | HEIGHT: 65 IN | BODY MASS INDEX: 37.32 KG/M2 | WEIGHT: 224 LBS

## 2020-11-19 DIAGNOSIS — Z01.419 WOMEN'S ANNUAL ROUTINE GYNECOLOGICAL EXAMINATION: Primary | ICD-10-CM

## 2020-11-19 DIAGNOSIS — Z71.89 COUNSELING FOR HORMONE REPLACEMENT THERAPY: ICD-10-CM

## 2020-11-19 DIAGNOSIS — N95.1 MENOPAUSAL SYMPTOMS: ICD-10-CM

## 2020-11-19 DIAGNOSIS — E66.9 OBESITY (BMI 35.0-39.9 WITHOUT COMORBIDITY): ICD-10-CM

## 2020-11-19 PROCEDURE — 99396 PREV VISIT EST AGE 40-64: CPT | Performed by: OBSTETRICS & GYNECOLOGY

## 2020-11-19 RX ORDER — ESTERIFIED ESTROGEN AND METHYLTESTOSTERONE .625; 1.25 MG/1; MG/1
1 TABLET ORAL
COMMUNITY
End: 2020-11-19 | Stop reason: SDUPTHER

## 2020-11-19 RX ORDER — ACYCLOVIR 400 MG/1
1 TABLET ORAL EVERY 8 HOURS
COMMUNITY
End: 2020-11-19 | Stop reason: SDUPTHER

## 2020-11-19 NOTE — PROGRESS NOTES
Subjective     Chief Complaint   Patient presents with   • Gynecologic Exam     Here for annual and pap       Blanka Carter is a 50 y.o. year old  presenting to be seen for her annual exam.      She is not sexually active.  In the past 12 months there have not been new sexual partners.  Condoms are not typically used.  She would not like to be screened for STD's at today's exam.     She exercises regularly: no.  She wears her seat belt: yes.  She has concerns about domestic violence: no.  She has noticed changes in height: no    GYN screening history:  · Last pap: she reports her last PAP was normal  · Last mammogram: she reports her last mammogram was normal  · Last fasting lipid profile: she reports her last lipid panel was normal  · Last 25-hydroxy vitamin D level: she does not know when her last Vitamin D level was done  · Last colonoscopy: she reports her last colonoscopy was normal  · Last DEXA: she has never had a DEXA.  Health Maintenance for Postmenopausal Women  Menopause is a normal process in which your ability to get pregnant comes to an end. This process happens slowly over many months or years, usually between the ages of 48 and 55. Menopause is complete when you have missed your menstrual periods for 12 months.  It is important to talk with your health care provider about some of the most common conditions that affect women after menopause (postmenopausal women). These include heart disease, cancer, and bone loss (osteoporosis). Adopting a healthy lifestyle and getting preventive care can help to promote your health and wellness. The actions you take can also lower your chances of developing some of these common conditions.  What should I know about menopause?  During menopause, you may get a number of symptoms, such as:  · Hot flashes. These can be moderate or severe.  · Night sweats.  · Decrease in sex drive.  · Mood swings.  · Headaches.  · Tiredness.  · Irritability.  · Memory  problems.  · Insomnia.  Choosing to treat or not to treat these symptoms is a decision that you make with your health care provider.  Do I need hormone replacement therapy?  · Hormone replacement therapy is effective in treating symptoms that are caused by menopause, such as hot flashes and night sweats.  · Hormone replacement carries certain risks, especially as you become older. If you are thinking about using estrogen or estrogen with progestin, discuss the benefits and risks with your health care provider.  What is my risk for heart disease and stroke?  The risk of heart disease, heart attack, and stroke increases as you age. One of the causes may be a change in the body's hormones during menopause. This can affect how your body uses dietary fats, triglycerides, and cholesterol. Heart attack and stroke are medical emergencies. There are many things that you can do to help prevent heart disease and stroke.  Watch your blood pressure  · High blood pressure causes heart disease and increases the risk of stroke. This is more likely to develop in people who have high blood pressure readings, are of  descent, or are overweight.  · Have your blood pressure checked:  ? Every 3-5 years if you are 18-39 years of age.  ? Every year if you are 40 years old or older.  Eat a healthy diet       · Eat a diet that includes plenty of vegetables, fruits, low-fat dairy products, and lean protein.  · Do not eat a lot of foods that are high in solid fats, added sugars, or sodium.  Get regular exercise  Get regular exercise. This is one of the most important things you can do for your health. Most adults should:  · Try to exercise for at least 150 minutes each week. The exercise should increase your heart rate and make you sweat (moderate-intensity exercise).  · Try to do strengthening exercises at least twice each week. Do these in addition to the moderate-intensity exercise.  · Spend less time sitting. Even light physical  activity can be beneficial.  Other tips  · Work with your health care provider to achieve or maintain a healthy weight.  · Do not use any products that contain nicotine or tobacco, such as cigarettes, e-cigarettes, and chewing tobacco. If you need help quitting, ask your health care provider.  · Know your numbers. Ask your health care provider to check your cholesterol and your blood sugar (glucose). Continue to have your blood tested as directed by your health care provider.  Do I need screening for cancer?  Depending on your health history and family history, you may need to have cancer screening at different stages of your life. This may include screening for:  · Breast cancer.  · Cervical cancer.  · Lung cancer.  · Colorectal cancer.  What is my risk for osteoporosis?  After menopause, you may be at increased risk for osteoporosis. Osteoporosis is a condition in which bone destruction happens more quickly than new bone creation. To help prevent osteoporosis or the bone fractures that can happen because of osteoporosis, you may take the following actions:  · If you are 19-50 years old, get at least 1,000 mg of calcium and at least 600 mg of vitamin D per day.  · If you are older than age 50 but younger than age 70, get at least 1,200 mg of calcium and at least 600 mg of vitamin D per day.  · If you are older than age 70, get at least 1,200 mg of calcium and at least 800 mg of vitamin D per day.  Smoking and drinking excessive alcohol increase the risk of osteoporosis. Eat foods that are rich in calcium and vitamin D, and do weight-bearing exercises several times each week as directed by your health care provider.  How does menopause affect my mental health?  Depression may occur at any age, but it is more common as you become older. Common symptoms of depression include:  · Low or sad mood.  · Changes in sleep patterns.  · Changes in appetite or eating patterns.  · Feeling an overall lack of motivation or  "enjoyment of activities that you previously enjoyed.  · Frequent crying spells.  Talk with your health care provider if you think that you are experiencing depression.  General instructions  See your health care provider for regular wellness exams and vaccines. This may include:  · Scheduling regular health, dental, and eye exams.  · Getting and maintaining your vaccines. These include:  ? Influenza vaccine. Get this vaccine each year before the flu season begins.  ? Pneumonia vaccine.  ? Shingles vaccine.  ? Tetanus, diphtheria, and pertussis (Tdap) booster vaccine.  Your health care provider may also recommend other immunizations.  Tell your health care provider if you have ever been abused or do not feel safe at home.  Summary  · Menopause is a normal process in which your ability to get pregnant comes to an end.  · This condition causes hot flashes, night sweats, decreased interest in sex, mood swings, headaches, or lack of sleep.  · Treatment for this condition may include hormone replacement therapy.  · Take actions to keep yourself healthy, including exercising regularly, eating a healthy diet, watching your weight, and checking your blood pressure and blood sugar levels.  · Get screened for cancer and depression. Make sure that you are up to date with all your vaccines.    No Additional Complaints Reported    The following portions of the patient's history were reviewed and updated as appropriate:vital signs, allergies, current medications, past medical history, past social history, past surgical history and problem list.    Review of Systems  Pertinent items are noted in HPI.     Physical Exam    Objective     /90   Ht 165.1 cm (65\")   Wt 102 kg (224 lb)   LMP 12/01/2013 Comment: hysterectomy  BMI 37.28 kg/m²     General:  well developed; well nourished  no acute distress  obese - Body mass index is 37.28 kg/m².   Constitutional: obese and healthy   Skin:  No suspicious lesions seen   Thyroid: " normal to inspection and palpation   Lungs:  breathing is unlabored  clear to auscultation bilaterally   Heart:  regular rate and rhythm, S1, S2 normal, no murmur, click, rub or gallop   Breasts:  Examined in supine position  Symmetric without masses or skin dimpling  Nipples normal without inversion, lesions or discharge  There are no palpable axillary nodes   Abdomen: soft, non-tender; no masses  no umbilical or inginual hernias are present  no hepato-splenomegaly   Pelvis: Clinical staff was present for exam  External genitalia:  normal appearance of the external genitalia including Bartholin's and Everly's glands.  :  urethral meatus normal; urethral hypermobility is absent.  Vaginal:  normal pink mucosa without prolapse or lesions.  Cervix:  normal appearance  Uterus:  absent  Adnexa:  normal bimanual exam of the adnexa.   Musculoskeletal: negative   Neuro: normal without focal findings, mental status, speech normal, alert and oriented x3 and LOBO   Psych: oriented to time, place and person, mood and affect are within normal limits, pt is a good historian; no memory problems were noted       Lab Review   No data reviewed    Imaging  No data reviewed    Assessment/Plan     ASSESSMENT  1. Women's annual routine gynecological examination    2. Menopausal symptoms    3. Obesity (BMI 35.0-39.9 without comorbidity)    4. Counseling for hormone replacement therapy        PLAN  Orders Placed This Encounter   Procedures   • DEXA Bone Density Axial     Standing Status:   Future     Standing Expiration Date:   11/19/2021     Order Specific Question:   Reason for Exam:     Answer:   screen     New Medications Ordered This Visit   Medications   • estrogens, conjugated, (PREMARIN) 0.625 MG tablet     Sig: Take 1 tablet by mouth Daily.     Dispense:  90 tablet     Refill:  3         Follow up: 1 year(s)         This note was electronically signed.    Jack Rodney MD  November 19, 2020

## 2020-11-30 DIAGNOSIS — Z01.419 WOMEN'S ANNUAL ROUTINE GYNECOLOGICAL EXAMINATION: ICD-10-CM

## 2020-12-21 ENCOUNTER — IMMUNIZATION (OUTPATIENT)
Dept: VACCINE CLINIC | Facility: HOSPITAL | Age: 50
End: 2020-12-21

## 2020-12-21 PROCEDURE — 0001A: CPT | Performed by: INTERNAL MEDICINE

## 2020-12-21 PROCEDURE — 91300 HC SARSCOV02 VAC 30MCG/0.3ML IM: CPT | Performed by: INTERNAL MEDICINE

## 2021-01-11 ENCOUNTER — IMMUNIZATION (OUTPATIENT)
Dept: VACCINE CLINIC | Facility: HOSPITAL | Age: 51
End: 2021-01-11

## 2021-01-11 PROCEDURE — 0001A: CPT | Performed by: INTERNAL MEDICINE

## 2021-01-11 PROCEDURE — 91300 HC SARSCOV02 VAC 30MCG/0.3ML IM: CPT | Performed by: INTERNAL MEDICINE

## 2021-01-11 PROCEDURE — 0002A: CPT | Performed by: INTERNAL MEDICINE

## 2021-02-17 ENCOUNTER — APPOINTMENT (OUTPATIENT)
Dept: MAMMOGRAPHY | Facility: HOSPITAL | Age: 51
End: 2021-02-17

## 2021-02-17 ENCOUNTER — HOSPITAL ENCOUNTER (OUTPATIENT)
Dept: BONE DENSITY | Facility: HOSPITAL | Age: 51
Discharge: HOME | End: 2021-02-17

## 2021-02-17 ENCOUNTER — HOSPITAL ENCOUNTER (OUTPATIENT)
Dept: MAMMOGRAPHY | Facility: HOSPITAL | Age: 51
Discharge: HOME | End: 2021-02-17

## 2021-02-17 DIAGNOSIS — Z01.419 WOMEN'S ANNUAL ROUTINE GYNECOLOGICAL EXAMINATION: ICD-10-CM

## 2021-02-17 DIAGNOSIS — Z12.31 VISIT FOR SCREENING MAMMOGRAM: ICD-10-CM

## 2021-02-17 DIAGNOSIS — N95.1 MENOPAUSAL SYMPTOMS: ICD-10-CM

## 2021-02-17 PROCEDURE — 77067 SCR MAMMO BI INCL CAD: CPT | Performed by: RADIOLOGY

## 2021-02-17 PROCEDURE — 77080 DXA BONE DENSITY AXIAL: CPT

## 2021-02-17 PROCEDURE — 77067 SCR MAMMO BI INCL CAD: CPT

## 2021-02-17 PROCEDURE — 77063 BREAST TOMOSYNTHESIS BI: CPT | Performed by: RADIOLOGY

## 2021-02-17 PROCEDURE — 77063 BREAST TOMOSYNTHESIS BI: CPT

## 2021-02-18 ENCOUNTER — TELEPHONE (OUTPATIENT)
Dept: OBSTETRICS AND GYNECOLOGY | Facility: CLINIC | Age: 51
End: 2021-02-18

## 2021-02-18 RX ORDER — VALACYCLOVIR HYDROCHLORIDE 1 G/1
1000 TABLET, FILM COATED ORAL DAILY
Qty: 90 TABLET | Refills: 3 | Status: SHIPPED | OUTPATIENT
Start: 2021-02-18 | End: 2021-12-08 | Stop reason: SDUPTHER

## 2021-03-16 ENCOUNTER — APPOINTMENT (OUTPATIENT)
Dept: BONE DENSITY | Facility: HOSPITAL | Age: 51
End: 2021-03-16

## 2021-03-31 ENCOUNTER — OFFICE VISIT (OUTPATIENT)
Dept: INTERNAL MEDICINE | Facility: CLINIC | Age: 51
End: 2021-03-31

## 2021-03-31 ENCOUNTER — LAB (OUTPATIENT)
Dept: LAB | Facility: HOSPITAL | Age: 51
End: 2021-03-31

## 2021-03-31 VITALS
DIASTOLIC BLOOD PRESSURE: 100 MMHG | HEIGHT: 65 IN | WEIGHT: 234.8 LBS | BODY MASS INDEX: 39.12 KG/M2 | SYSTOLIC BLOOD PRESSURE: 152 MMHG | TEMPERATURE: 97.7 F | OXYGEN SATURATION: 97 % | HEART RATE: 67 BPM

## 2021-03-31 DIAGNOSIS — I10 BENIGN ESSENTIAL HYPERTENSION: Primary | ICD-10-CM

## 2021-03-31 DIAGNOSIS — I10 BENIGN ESSENTIAL HYPERTENSION: ICD-10-CM

## 2021-03-31 LAB
ALBUMIN SERPL-MCNC: 4.1 G/DL (ref 3.5–5.2)
ALBUMIN UR-MCNC: <1.2 MG/DL
ALBUMIN/GLOB SERPL: 1.4 G/DL
ALP SERPL-CCNC: 69 U/L (ref 39–117)
ALT SERPL W P-5'-P-CCNC: 28 U/L (ref 1–33)
ANION GAP SERPL CALCULATED.3IONS-SCNC: 9.4 MMOL/L (ref 5–15)
AST SERPL-CCNC: 28 U/L (ref 1–32)
BASOPHILS # BLD AUTO: 0.02 10*3/MM3 (ref 0–0.2)
BASOPHILS NFR BLD AUTO: 0.3 % (ref 0–1.5)
BILIRUB SERPL-MCNC: 0.2 MG/DL (ref 0–1.2)
BUN SERPL-MCNC: 13 MG/DL (ref 6–20)
BUN/CREAT SERPL: 16.5 (ref 7–25)
CALCIUM SPEC-SCNC: 9.6 MG/DL (ref 8.6–10.5)
CHLORIDE SERPL-SCNC: 102 MMOL/L (ref 98–107)
CHOLEST SERPL-MCNC: 172 MG/DL (ref 0–200)
CO2 SERPL-SCNC: 26.6 MMOL/L (ref 22–29)
CREAT SERPL-MCNC: 0.79 MG/DL (ref 0.57–1)
CREAT UR-MCNC: 59 MG/DL
DEPRECATED RDW RBC AUTO: 46.5 FL (ref 37–54)
EOSINOPHIL # BLD AUTO: 0.13 10*3/MM3 (ref 0–0.4)
EOSINOPHIL NFR BLD AUTO: 1.7 % (ref 0.3–6.2)
ERYTHROCYTE [DISTWIDTH] IN BLOOD BY AUTOMATED COUNT: 14.2 % (ref 12.3–15.4)
GFR SERPL CREATININE-BSD FRML MDRD: 76 ML/MIN/1.73
GLOBULIN UR ELPH-MCNC: 2.9 GM/DL
GLUCOSE SERPL-MCNC: 85 MG/DL (ref 65–99)
HCT VFR BLD AUTO: 40.2 % (ref 34–46.6)
HDLC SERPL-MCNC: 66 MG/DL (ref 40–60)
HGB BLD-MCNC: 13.4 G/DL (ref 12–15.9)
IMM GRANULOCYTES # BLD AUTO: 0.03 10*3/MM3 (ref 0–0.05)
IMM GRANULOCYTES NFR BLD AUTO: 0.4 % (ref 0–0.5)
LDLC SERPL CALC-MCNC: 86 MG/DL (ref 0–100)
LDLC/HDLC SERPL: 1.26 {RATIO}
LYMPHOCYTES # BLD AUTO: 1.66 10*3/MM3 (ref 0.7–3.1)
LYMPHOCYTES NFR BLD AUTO: 22.3 % (ref 19.6–45.3)
MCH RBC QN AUTO: 29.8 PG (ref 26.6–33)
MCHC RBC AUTO-ENTMCNC: 33.3 G/DL (ref 31.5–35.7)
MCV RBC AUTO: 89.3 FL (ref 79–97)
MICROALBUMIN/CREAT UR: NORMAL MG/G{CREAT}
MONOCYTES # BLD AUTO: 0.57 10*3/MM3 (ref 0.1–0.9)
MONOCYTES NFR BLD AUTO: 7.7 % (ref 5–12)
NEUTROPHILS NFR BLD AUTO: 5.04 10*3/MM3 (ref 1.7–7)
NEUTROPHILS NFR BLD AUTO: 67.6 % (ref 42.7–76)
NRBC BLD AUTO-RTO: 0 /100 WBC (ref 0–0.2)
PLATELET # BLD AUTO: 332 10*3/MM3 (ref 140–450)
PMV BLD AUTO: 9.7 FL (ref 6–12)
POTASSIUM SERPL-SCNC: 4 MMOL/L (ref 3.5–5.2)
PROT SERPL-MCNC: 7 G/DL (ref 6–8.5)
RBC # BLD AUTO: 4.5 10*6/MM3 (ref 3.77–5.28)
SODIUM SERPL-SCNC: 138 MMOL/L (ref 136–145)
TRIGL SERPL-MCNC: 114 MG/DL (ref 0–150)
VLDLC SERPL-MCNC: 20 MG/DL (ref 5–40)
WBC # BLD AUTO: 7.45 10*3/MM3 (ref 3.4–10.8)

## 2021-03-31 PROCEDURE — 80061 LIPID PANEL: CPT

## 2021-03-31 PROCEDURE — 99213 OFFICE O/P EST LOW 20 MIN: CPT | Performed by: INTERNAL MEDICINE

## 2021-03-31 PROCEDURE — 80053 COMPREHEN METABOLIC PANEL: CPT

## 2021-03-31 PROCEDURE — 82043 UR ALBUMIN QUANTITATIVE: CPT

## 2021-03-31 PROCEDURE — 82570 ASSAY OF URINE CREATININE: CPT

## 2021-03-31 PROCEDURE — 85025 COMPLETE CBC W/AUTO DIFF WBC: CPT

## 2021-03-31 RX ORDER — LISINOPRIL 10 MG/1
10 TABLET ORAL DAILY
Qty: 30 TABLET | Refills: 2 | Status: SHIPPED | OUTPATIENT
Start: 2021-03-31 | End: 2021-06-30 | Stop reason: SDUPTHER

## 2021-04-03 PROCEDURE — 93000 ELECTROCARDIOGRAM COMPLETE: CPT | Performed by: INTERNAL MEDICINE

## 2021-04-14 ENCOUNTER — OFFICE VISIT (OUTPATIENT)
Dept: INTERNAL MEDICINE | Facility: CLINIC | Age: 51
End: 2021-04-14

## 2021-04-14 VITALS
DIASTOLIC BLOOD PRESSURE: 82 MMHG | TEMPERATURE: 97.5 F | BODY MASS INDEX: 38.69 KG/M2 | HEIGHT: 65 IN | SYSTOLIC BLOOD PRESSURE: 140 MMHG | WEIGHT: 232.2 LBS | HEART RATE: 52 BPM | OXYGEN SATURATION: 98 %

## 2021-04-14 DIAGNOSIS — I10 ESSENTIAL HYPERTENSION: Primary | ICD-10-CM

## 2021-04-14 PROCEDURE — 99213 OFFICE O/P EST LOW 20 MIN: CPT | Performed by: INTERNAL MEDICINE

## 2021-04-14 NOTE — PROGRESS NOTES
[facility]     Blanka Carter  1970   [id number]      Patient Care Team:  Slava Hair MD as PCP - General  Slava Hair MD as PCP - Family Medicine    Chief Complaint::   Chief Complaint   Patient presents with   • Hypertension     2 wk. f/u        HPI  Mrs. Carter comes in for follow-up of her hypertension.  She is taking metoprolol in the morning and lisinopril in the evening.  Blood pressures in the morning typically systolics are 103 and in the evening 130.  She is tolerating medications well.  There is no cough or fatigue.  She has lost 2 pounds since presentation 2 weeks ago.    Chronic Conditions:      Patient Active Problem List   Diagnosis   • Herpes zoster without complication   • Acute left-sided low back pain with left-sided sciatica   • Disc disorder of cervical region   • Obesity (BMI 35.0-39.9 without comorbidity)   • Menopause   • Impacted cerumen of right ear   • Allergic rhinitis   • Ear pain, right   • Annual physical exam   • Bilateral hip pain   • Current moderate episode of major depressive disorder without prior episode (CMS/HCC)   • Menopausal symptoms   • Counseling for hormone replacement therapy   • Essential hypertension        Past Medical History:   Diagnosis Date   • Cervical spondylosis    • HSV infection    • Seizure disorder (CMS/HCC)     resolved       Past Surgical History:   Procedure Laterality Date   •  SECTION     • HYSTERECTOMY      LSH   • SUBTOTAL HYSTERECTOMY     • TUBAL ABDOMINAL LIGATION         Family History   Problem Relation Age of Onset   • Diabetes Father         type 2   • Osteoarthritis Mother    • Breast cancer Neg Hx    • Ovarian cancer Neg Hx        Social History     Socioeconomic History   • Marital status:      Spouse name: Not on file   • Number of children: Not on file   • Years of education: Not on file   • Highest education level: Not on file   Tobacco Use   • Smoking status: Former Smoker      "Packs/day: 0.10     Years: 14.00     Pack years: 1.40     Types: Cigarettes     Start date:      Quit date:      Years since quittin.2   • Smokeless tobacco: Never Used   • Tobacco comment: 2 cigarettes per day   Vaping Use   • Vaping Use: Never used   Substance and Sexual Activity   • Alcohol use: Yes     Comment: once a month   • Drug use: No   • Sexual activity: Yes     Partners: Male       No Known Allergies      Current Outpatient Medications:   •  estrogens, conjugated, (PREMARIN) 0.625 MG tablet, Take 1 tablet by mouth Daily., Disp: 90 tablet, Rfl: 3  •  lisinopril (PRINIVIL,ZESTRIL) 10 MG tablet, Take 1 tablet by mouth Daily., Disp: 30 tablet, Rfl: 2  •  metoprolol succinate XL (TOPROL-XL) 50 MG 24 hr tablet, Take 1 tablet by mouth Daily., Disp: 90 tablet, Rfl: 3  •  valACYclovir (Valtrex) 1000 MG tablet, Take 1 tablet by mouth Daily., Disp: 90 tablet, Rfl: 3    Review of Systems   Constitutional: Negative for chills, fatigue and fever.   HENT: Positive for sinus pressure. Negative for congestion and ear pain.    Respiratory: Negative for cough, chest tightness, shortness of breath and wheezing.    Cardiovascular: Negative for chest pain and palpitations.   Gastrointestinal: Negative for abdominal pain, blood in stool and constipation.   Skin: Negative for color change.   Allergic/Immunologic: Positive for environmental allergies.   Neurological: Negative for dizziness, speech difficulty and headache.   Psychiatric/Behavioral: Negative for decreased concentration. The patient is not nervous/anxious.         Vital Signs  Vitals:    21 0803   BP: 140/82   BP Location: Left arm   Patient Position: Sitting   Cuff Size: Large Adult   Pulse: 52   Temp: 97.5 °F (36.4 °C)   TempSrc: Temporal   SpO2: 98%   Weight: 105 kg (232 lb 3.2 oz)   Height: 165.1 cm (65\")   PainSc: 0-No pain       Physical Exam  Vitals reviewed.   Constitutional:       Appearance: She is well-developed.   HENT:      Head: " Normocephalic and atraumatic.   Cardiovascular:      Rate and Rhythm: Normal rate and regular rhythm.      Heart sounds: Normal heart sounds. No murmur heard.     Pulmonary:      Effort: Pulmonary effort is normal.      Breath sounds: Normal breath sounds.   Neurological:      Mental Status: She is alert and oriented to person, place, and time.          Procedures    ACE III MINI             Assessment/Plan:    Diagnoses and all orders for this visit:    1. Essential hypertension (Primary)    Plan    Blood pressure now controlled on lisinopril and metoprolol.  Blood pressure goal is 130/80 and below.  She will continue working on weight loss.  She has discontinued meloxicam and uses ibuprofen at a low dose only as needed.  We will plan follow-up in 6 months, she will email if blood pressures running consistently above goal.      Plan of care reviewed with patient at the conclusion of today's visit. Education was provided regarding diagnosis, management, and any prescribed or recommended OTC medications.Patient verbalizes understanding of and agreement with management plan.         Slava Hair MD           Answers for HPI/ROS submitted by the patient on 4/7/2021  What is the primary reason for your visit?: High Blood Pressure

## 2021-06-30 RX ORDER — LISINOPRIL 10 MG/1
10 TABLET ORAL DAILY
Qty: 30 TABLET | Refills: 5 | Status: SHIPPED | OUTPATIENT
Start: 2021-06-30 | End: 2022-01-06 | Stop reason: SDUPTHER

## 2021-10-06 ENCOUNTER — IMMUNIZATION (OUTPATIENT)
Dept: VACCINE CLINIC | Facility: HOSPITAL | Age: 51
End: 2021-10-06

## 2021-10-06 PROCEDURE — 91300 HC SARSCOV02 VAC 30MCG/0.3ML IM: CPT | Performed by: INTERNAL MEDICINE

## 2021-10-06 PROCEDURE — 0003A: CPT | Performed by: INTERNAL MEDICINE

## 2021-10-06 PROCEDURE — 0004A ADM SARSCOV2 30MCG/0.3ML BOOSTER: CPT | Performed by: INTERNAL MEDICINE

## 2021-10-15 RX ORDER — SCOLOPAMINE TRANSDERMAL SYSTEM 1 MG/1
1 PATCH, EXTENDED RELEASE TRANSDERMAL
Qty: 4 PATCH | Refills: 0 | Status: SHIPPED | OUTPATIENT
Start: 2021-10-15 | End: 2021-12-08

## 2021-10-27 ENCOUNTER — LAB (OUTPATIENT)
Dept: LAB | Facility: HOSPITAL | Age: 51
End: 2021-10-27

## 2021-10-27 ENCOUNTER — OFFICE VISIT (OUTPATIENT)
Dept: INTERNAL MEDICINE | Facility: CLINIC | Age: 51
End: 2021-10-27

## 2021-10-27 ENCOUNTER — OFFICE VISIT (OUTPATIENT)
Dept: NEUROSURGERY | Facility: CLINIC | Age: 51
End: 2021-10-27

## 2021-10-27 VITALS
TEMPERATURE: 97.6 F | DIASTOLIC BLOOD PRESSURE: 90 MMHG | SYSTOLIC BLOOD PRESSURE: 142 MMHG | BODY MASS INDEX: 39.82 KG/M2 | HEIGHT: 65 IN | WEIGHT: 239 LBS

## 2021-10-27 VITALS
WEIGHT: 239 LBS | BODY MASS INDEX: 39.82 KG/M2 | DIASTOLIC BLOOD PRESSURE: 88 MMHG | SYSTOLIC BLOOD PRESSURE: 132 MMHG | HEIGHT: 65 IN | HEART RATE: 56 BPM | TEMPERATURE: 99.3 F

## 2021-10-27 DIAGNOSIS — E78.5 DYSLIPIDEMIA: ICD-10-CM

## 2021-10-27 DIAGNOSIS — I10 ESSENTIAL HYPERTENSION: ICD-10-CM

## 2021-10-27 DIAGNOSIS — M54.2 NECK PAIN, CHRONIC: Primary | ICD-10-CM

## 2021-10-27 DIAGNOSIS — Z11.59 ENCOUNTER FOR HEPATITIS C SCREENING TEST FOR LOW RISK PATIENT: ICD-10-CM

## 2021-10-27 DIAGNOSIS — R20.2 NUMBNESS AND TINGLING IN RIGHT HAND: ICD-10-CM

## 2021-10-27 DIAGNOSIS — F32.1 CURRENT MODERATE EPISODE OF MAJOR DEPRESSIVE DISORDER WITHOUT PRIOR EPISODE (HCC): ICD-10-CM

## 2021-10-27 DIAGNOSIS — R20.0 NUMBNESS AND TINGLING IN RIGHT HAND: ICD-10-CM

## 2021-10-27 DIAGNOSIS — G89.29 NECK PAIN, CHRONIC: Primary | ICD-10-CM

## 2021-10-27 DIAGNOSIS — Z00.00 PREVENTATIVE HEALTH CARE: Primary | ICD-10-CM

## 2021-10-27 PROBLEM — M54.42 ACUTE LEFT-SIDED LOW BACK PAIN WITH LEFT-SIDED SCIATICA: Status: RESOLVED | Noted: 2019-04-16 | Resolved: 2021-10-27

## 2021-10-27 PROBLEM — H61.21 IMPACTED CERUMEN OF RIGHT EAR: Status: RESOLVED | Noted: 2019-04-16 | Resolved: 2021-10-27

## 2021-10-27 PROBLEM — B02.9 HERPES ZOSTER WITHOUT COMPLICATION: Status: RESOLVED | Noted: 2019-04-16 | Resolved: 2021-10-27

## 2021-10-27 LAB
ALBUMIN SERPL-MCNC: 4.3 G/DL (ref 3.5–5.2)
ALBUMIN UR-MCNC: <1.2 MG/DL
ALBUMIN/GLOB SERPL: 1.3 G/DL
ALP SERPL-CCNC: 81 U/L (ref 39–117)
ALT SERPL W P-5'-P-CCNC: 25 U/L (ref 1–33)
ANION GAP SERPL CALCULATED.3IONS-SCNC: 6.5 MMOL/L (ref 5–15)
AST SERPL-CCNC: 27 U/L (ref 1–32)
BASOPHILS # BLD AUTO: 0.03 10*3/MM3 (ref 0–0.2)
BASOPHILS NFR BLD AUTO: 0.4 % (ref 0–1.5)
BILIRUB SERPL-MCNC: 0.4 MG/DL (ref 0–1.2)
BILIRUB UR QL STRIP: NEGATIVE
BUN SERPL-MCNC: 8 MG/DL (ref 6–20)
BUN/CREAT SERPL: 10 (ref 7–25)
CALCIUM SPEC-SCNC: 9.1 MG/DL (ref 8.6–10.5)
CHLORIDE SERPL-SCNC: 102 MMOL/L (ref 98–107)
CHOLEST SERPL-MCNC: 192 MG/DL (ref 0–200)
CLARITY UR: CLEAR
CO2 SERPL-SCNC: 29.5 MMOL/L (ref 22–29)
COLOR UR: YELLOW
CREAT SERPL-MCNC: 0.8 MG/DL (ref 0.57–1)
CREAT UR-MCNC: 110.1 MG/DL
DEPRECATED RDW RBC AUTO: 45.4 FL (ref 37–54)
EOSINOPHIL # BLD AUTO: 0.16 10*3/MM3 (ref 0–0.4)
EOSINOPHIL NFR BLD AUTO: 2 % (ref 0.3–6.2)
ERYTHROCYTE [DISTWIDTH] IN BLOOD BY AUTOMATED COUNT: 13.3 % (ref 12.3–15.4)
GFR SERPL CREATININE-BSD FRML MDRD: 75 ML/MIN/1.73
GLOBULIN UR ELPH-MCNC: 3.2 GM/DL
GLUCOSE SERPL-MCNC: 90 MG/DL (ref 65–99)
GLUCOSE UR STRIP-MCNC: NEGATIVE MG/DL
HCT VFR BLD AUTO: 42.9 % (ref 34–46.6)
HDLC SERPL-MCNC: 49 MG/DL (ref 40–60)
HGB BLD-MCNC: 14.1 G/DL (ref 12–15.9)
HGB UR QL STRIP.AUTO: NEGATIVE
IMM GRANULOCYTES # BLD AUTO: 0.04 10*3/MM3 (ref 0–0.05)
IMM GRANULOCYTES NFR BLD AUTO: 0.5 % (ref 0–0.5)
KETONES UR QL STRIP: NEGATIVE
LDLC SERPL CALC-MCNC: 120 MG/DL (ref 0–100)
LDLC/HDLC SERPL: 2.4 {RATIO}
LEUKOCYTE ESTERASE UR QL STRIP.AUTO: NEGATIVE
LYMPHOCYTES # BLD AUTO: 1.71 10*3/MM3 (ref 0.7–3.1)
LYMPHOCYTES NFR BLD AUTO: 21.2 % (ref 19.6–45.3)
MCH RBC QN AUTO: 30.7 PG (ref 26.6–33)
MCHC RBC AUTO-ENTMCNC: 32.9 G/DL (ref 31.5–35.7)
MCV RBC AUTO: 93.3 FL (ref 79–97)
MICROALBUMIN/CREAT UR: NORMAL MG/G{CREAT}
MONOCYTES # BLD AUTO: 0.6 10*3/MM3 (ref 0.1–0.9)
MONOCYTES NFR BLD AUTO: 7.4 % (ref 5–12)
NEUTROPHILS NFR BLD AUTO: 5.54 10*3/MM3 (ref 1.7–7)
NEUTROPHILS NFR BLD AUTO: 68.5 % (ref 42.7–76)
NITRITE UR QL STRIP: NEGATIVE
NRBC BLD AUTO-RTO: 0.1 /100 WBC (ref 0–0.2)
PH UR STRIP.AUTO: 7 [PH] (ref 5–8)
PLATELET # BLD AUTO: 328 10*3/MM3 (ref 140–450)
PMV BLD AUTO: 9.8 FL (ref 6–12)
POTASSIUM SERPL-SCNC: 4.1 MMOL/L (ref 3.5–5.2)
PROT SERPL-MCNC: 7.5 G/DL (ref 6–8.5)
PROT UR QL STRIP: NEGATIVE
RBC # BLD AUTO: 4.6 10*6/MM3 (ref 3.77–5.28)
SODIUM SERPL-SCNC: 138 MMOL/L (ref 136–145)
SP GR UR STRIP: 1.02 (ref 1–1.03)
TRIGL SERPL-MCNC: 127 MG/DL (ref 0–150)
UROBILINOGEN UR QL STRIP: NORMAL
VLDLC SERPL-MCNC: 23 MG/DL (ref 5–40)
WBC # BLD AUTO: 8.08 10*3/MM3 (ref 3.4–10.8)

## 2021-10-27 PROCEDURE — 81003 URINALYSIS AUTO W/O SCOPE: CPT

## 2021-10-27 PROCEDURE — 82043 UR ALBUMIN QUANTITATIVE: CPT

## 2021-10-27 PROCEDURE — 82570 ASSAY OF URINE CREATININE: CPT

## 2021-10-27 PROCEDURE — 86803 HEPATITIS C AB TEST: CPT

## 2021-10-27 PROCEDURE — 85025 COMPLETE CBC W/AUTO DIFF WBC: CPT

## 2021-10-27 PROCEDURE — 99396 PREV VISIT EST AGE 40-64: CPT | Performed by: INTERNAL MEDICINE

## 2021-10-27 PROCEDURE — 80053 COMPREHEN METABOLIC PANEL: CPT

## 2021-10-27 PROCEDURE — 99203 OFFICE O/P NEW LOW 30 MIN: CPT | Performed by: PHYSICIAN ASSISTANT

## 2021-10-27 PROCEDURE — 80061 LIPID PANEL: CPT

## 2021-10-27 RX ORDER — CELECOXIB 200 MG/1
200 CAPSULE ORAL DAILY
Qty: 30 CAPSULE | Refills: 5 | Status: SHIPPED | OUTPATIENT
Start: 2021-10-27 | End: 2022-12-23

## 2021-10-27 RX ORDER — METHOCARBAMOL 750 MG/1
750 TABLET, FILM COATED ORAL 3 TIMES DAILY
Qty: 60 TABLET | Refills: 1 | Status: SHIPPED | OUTPATIENT
Start: 2021-10-27 | End: 2022-03-30

## 2021-10-27 NOTE — PROGRESS NOTES
[facility]     Blanka Carter  1970   [id number]      Patient Care Team:  Slava Hair MD as PCP - General  Slava Hair MD as PCP - Family Medicine    Chief Complaint::   Chief Complaint   Patient presents with   • Annual Exam        HPI  Ms. Carter is now 51.  She comes in for follow-up of her hypertension, dyslipidemia, depression and for annual examination.  Overall she feels well.  She tries to go to the gym 2 to 3 days a week.  Her strength and stamina are generally good.  She has mid back pain that wakes her up early in the morning despite taking ibuprofen at night.  There is no dysuria.  Her mood is normal without antidepressants.  She experiences gas, belching and reflux symptoms if she overeats and then within 12 hours has abdominal cramping and diarrhea.  She is up-to-date on GYN care and fully vaccinated against COVID-19.    Chronic Conditions:      Patient Active Problem List   Diagnosis   • Disc disorder of cervical region   • Obesity (BMI 35.0-39.9 without comorbidity)   • Menopause   • Allergic rhinitis   • Ear pain, right   • Annual physical exam   • Bilateral hip pain   • Current moderate episode of major depressive disorder without prior episode (HCC)   • Menopausal symptoms   • Counseling for hormone replacement therapy   • Essential hypertension        Past Medical History:   Diagnosis Date   • Cervical spondylosis    • Herpes zoster without complication 2019   • HSV infection    • Seizure disorder (HCC)     resolved       Past Surgical History:   Procedure Laterality Date   •  SECTION     • HYSTERECTOMY      LSH   • SUBTOTAL HYSTERECTOMY     • TUBAL ABDOMINAL LIGATION         Family History   Problem Relation Age of Onset   • Diabetes Father         type 2   • Osteoarthritis Mother    • Breast cancer Neg Hx    • Ovarian cancer Neg Hx        Social History     Socioeconomic History   • Marital status:    Tobacco Use   • Smoking  status: Former Smoker     Packs/day: 0.10     Years: 14.00     Pack years: 1.40     Types: Cigarettes     Start date:      Quit date:      Years since quittin.8   • Smokeless tobacco: Never Used   • Tobacco comment: 2 cigarettes per day   Vaping Use   • Vaping Use: Never used   Substance and Sexual Activity   • Alcohol use: Yes     Comment: once a month   • Drug use: No   • Sexual activity: Yes     Partners: Male       No Known Allergies      Current Outpatient Medications:   •  estrogens, conjugated, (PREMARIN) 0.625 MG tablet, Take 1 tablet by mouth Daily., Disp: 90 tablet, Rfl: 3  •  lisinopril (PRINIVIL,ZESTRIL) 10 MG tablet, Take 1 tablet by mouth Daily., Disp: 30 tablet, Rfl: 5  •  metoprolol succinate XL (TOPROL-XL) 50 MG 24 hr tablet, Take 1 tablet by mouth Daily., Disp: 90 tablet, Rfl: 3  •  Pseudoephedrine-Ibuprofen (ADVIL COLD/SINUS PO), Take 1 tablet by mouth At Night As Needed., Disp: , Rfl:   •  Scopolamine (Transderm-Scop, 1.5 MG,) 1 MG/3DAYS patch, Place 1 patch on the skin as directed by provider Every 72 (Seventy-Two) Hours., Disp: 4 patch, Rfl: 0  •  valACYclovir (Valtrex) 1000 MG tablet, Take 1 tablet by mouth Daily., Disp: 90 tablet, Rfl: 3  •  celecoxib (CeleBREX) 200 MG capsule, Take 1 capsule by mouth Daily., Disp: 30 capsule, Rfl: 5  •  methocarbamol (ROBAXIN) 750 MG tablet, Take 1 tablet by mouth 3 (Three) Times a Day., Disp: 60 tablet, Rfl: 1    Immunization History   Administered Date(s) Administered   • COVID-19 (PFIZER) 2020, 2021, 10/06/2021   • Flu Vaccine Quad PF >18YRS 10/14/2015, 10/01/2016, 2020   • Flu Vaccine Quad PF >36MO 10/24/2017   • Hepatitis A 2018, 2019   • Influenza, Unspecified 10/18/2018   • Tdap 2013        Health Maintenance Due   Topic Date Due   • HEPATITIS C SCREENING  Never done   • ZOSTER VACCINE (1 of 2) Never done   • ANNUAL PHYSICAL  11/10/2018   • INFLUENZA VACCINE  2021        Review of Systems  "  Constitutional: Negative for activity change, chills, fatigue, fever, unexpected weight gain and unexpected weight loss.   HENT: Negative for congestion, ear pain, hearing loss, postnasal drip, sinus pressure, trouble swallowing and voice change.    Eyes: Negative for blurred vision, double vision and visual disturbance.   Respiratory: Negative for cough and shortness of breath.    Cardiovascular: Negative for chest pain, palpitations and leg swelling.   Gastrointestinal: Positive for indigestion. Negative for abdominal pain, blood in stool, constipation, diarrhea, nausea and vomiting.   Endocrine: Negative for cold intolerance, heat intolerance, polydipsia and polyuria.   Genitourinary: Negative for breast discharge, breast lump, decreased libido, dysuria, menstrual problem, urgency, urinary incontinence, vaginal bleeding and vaginal discharge.   Musculoskeletal: Negative for back pain, joint swelling and myalgias.   Skin: Negative for skin lesions.   Allergic/Immunologic: Negative for environmental allergies.   Neurological: Negative for dizziness, syncope, speech difficulty, weakness, light-headedness, numbness, headache, memory problem and confusion.   Hematological: Negative for adenopathy. Does not bruise/bleed easily.   Psychiatric/Behavioral: Negative for agitation, behavioral problems, decreased concentration, sleep disturbance, depressed mood and stress. The patient is not nervous/anxious.         Vital Signs  Vitals:    10/27/21 1018   BP: 132/88   BP Location: Left arm   Patient Position: Sitting   Cuff Size: Large Adult   Pulse: 56   Temp: 99.3 °F (37.4 °C)   Weight: 108 kg (239 lb)   Height: 163.8 cm (64.5\")   PainSc:   2   PainLoc: Back       Physical Exam  Vitals and nursing note reviewed.   Constitutional:       Appearance: She is well-developed. She is obese.   HENT:      Head: Normocephalic and atraumatic.      Right Ear: External ear normal.      Left Ear: External ear normal.      Nose: Nose " normal.      Mouth/Throat:      Pharynx: No oropharyngeal exudate.   Eyes:      Conjunctiva/sclera: Conjunctivae normal.      Pupils: Pupils are equal, round, and reactive to light.   Neck:      Thyroid: No thyromegaly.      Vascular: No JVD.   Cardiovascular:      Rate and Rhythm: Normal rate and regular rhythm.      Heart sounds: Normal heart sounds. No murmur heard.  No friction rub. No gallop.    Pulmonary:      Effort: Pulmonary effort is normal. No respiratory distress.      Breath sounds: Normal breath sounds. No wheezing or rales.   Chest:      Chest wall: No tenderness.   Abdominal:      General: Bowel sounds are normal. There is no distension.      Palpations: Abdomen is soft. There is no mass.      Tenderness: There is no abdominal tenderness. There is no guarding or rebound.      Hernia: No hernia is present.   Musculoskeletal:         General: No tenderness. Normal range of motion.      Cervical back: Normal range of motion and neck supple.   Lymphadenopathy:      Cervical: No cervical adenopathy.   Skin:     General: Skin is warm and dry.      Findings: No erythema or rash.   Neurological:      Mental Status: She is alert and oriented to person, place, and time.      Cranial Nerves: No cranial nerve deficit.      Sensory: No sensory deficit.      Motor: No abnormal muscle tone.      Coordination: Coordination normal.      Deep Tendon Reflexes: Reflexes normal.   Psychiatric:         Behavior: Behavior normal.         Thought Content: Thought content normal.         Judgment: Judgment normal.          Procedures     Fall Risk Screen:  [facility].    Health Habits and Functional and Cognitive Screening:  No flowsheet data found.    Depression [place]  PHQ-9 Total Score:       ACE III MINI             Assessment/Plan:    Diagnoses and all orders for this visit:    1. Preventative health care (Primary)    2. Essential hypertension  -     CBC & Differential;  Future  -     Comprehensive Metabolic Panel; Future  -     Microalbumin / Creatinine Urine Ratio - Urine, Clean Catch; Future  -     Urinalysis With Culture If Indicated - Urine, Clean Catch; Future    3. Dyslipidemia  -     Lipid Panel; Future    4. Encounter for hepatitis C screening test for low risk patient  -     Hepatitis C Antibody; Future    5. Current moderate episode of major depressive disorder without prior episode (HCC)    Other orders  -     celecoxib (CeleBREX) 200 MG capsule; Take 1 capsule by mouth Daily.  Dispense: 30 capsule; Refill: 5    Plan    Overall she is doing very well with reasonably good lifestyle habits.  She is up-to-date on GYN care.  Colonoscopy was performed in May 2019, due at 10 years.  She is fully vaccinated against COVID-19 and will get a flu shot at work.    Blood pressure is well controlled on metoprolol and lisinopril.    Lipid panel is pending, the treatment remains healthy diet and avoidance of weight gain.    There is no evidence of depression without current need for antidepressant therapy.    Reflux symptoms appear to be controlled by diet however is symptoms did  progress will refer for EGD.  If she continues to have cramping and diarrhea could consider dicyclomine.    Patient's Body mass index is 40.39 kg/m². indicating that she is morbidly obese (BMI > 40 or > 35 with obesity - related health condition). Obesity-related health conditions include the following: hypertension and dyslipidemias. Obesity is unchanged. BMI is is above average; BMI management plan is completed. We discussed portion control, increasing exercise and joining a fitness center or start home based exercise program..          Labs  Results for orders placed or performed in visit on 03/31/21   Comprehensive Metabolic Panel    Specimen: Blood   Result Value Ref Range    Glucose 85 65 - 99 mg/dL    BUN 13 6 - 20 mg/dL    Creatinine 0.79 0.57 - 1.00 mg/dL    Sodium 138 136 - 145 mmol/L    Potassium  4.0 3.5 - 5.2 mmol/L    Chloride 102 98 - 107 mmol/L    CO2 26.6 22.0 - 29.0 mmol/L    Calcium 9.6 8.6 - 10.5 mg/dL    Total Protein 7.0 6.0 - 8.5 g/dL    Albumin 4.10 3.50 - 5.20 g/dL    ALT (SGPT) 28 1 - 33 U/L    AST (SGOT) 28 1 - 32 U/L    Alkaline Phosphatase 69 39 - 117 U/L    Total Bilirubin 0.2 0.0 - 1.2 mg/dL    eGFR Non African Amer 76 >60 mL/min/1.73    Globulin 2.9 gm/dL    A/G Ratio 1.4 g/dL    BUN/Creatinine Ratio 16.5 7.0 - 25.0    Anion Gap 9.4 5.0 - 15.0 mmol/L   Lipid Panel    Specimen: Blood   Result Value Ref Range    Total Cholesterol 172 0 - 200 mg/dL    Triglycerides 114 0 - 150 mg/dL    HDL Cholesterol 66 (H) 40 - 60 mg/dL    LDL Cholesterol  86 0 - 100 mg/dL    VLDL Cholesterol 20 5 - 40 mg/dL    LDL/HDL Ratio 1.26    Microalbumin / Creatinine Urine Ratio - Urine, Clean Catch    Specimen: Urine, Clean Catch   Result Value Ref Range    Microalbumin/Creatinine Ratio      Creatinine, Urine 59.0 mg/dL    Microalbumin, Urine <1.2 mg/dL   CBC Auto Differential    Specimen: Blood   Result Value Ref Range    WBC 7.45 3.40 - 10.80 10*3/mm3    RBC 4.50 3.77 - 5.28 10*6/mm3    Hemoglobin 13.4 12.0 - 15.9 g/dL    Hematocrit 40.2 34.0 - 46.6 %    MCV 89.3 79.0 - 97.0 fL    MCH 29.8 26.6 - 33.0 pg    MCHC 33.3 31.5 - 35.7 g/dL    RDW 14.2 12.3 - 15.4 %    RDW-SD 46.5 37.0 - 54.0 fl    MPV 9.7 6.0 - 12.0 fL    Platelets 332 140 - 450 10*3/mm3    Neutrophil % 67.6 42.7 - 76.0 %    Lymphocyte % 22.3 19.6 - 45.3 %    Monocyte % 7.7 5.0 - 12.0 %    Eosinophil % 1.7 0.3 - 6.2 %    Basophil % 0.3 0.0 - 1.5 %    Immature Grans % 0.4 0.0 - 0.5 %    Neutrophils, Absolute 5.04 1.70 - 7.00 10*3/mm3    Lymphocytes, Absolute 1.66 0.70 - 3.10 10*3/mm3    Monocytes, Absolute 0.57 0.10 - 0.90 10*3/mm3    Eosinophils, Absolute 0.13 0.00 - 0.40 10*3/mm3    Basophils, Absolute 0.02 0.00 - 0.20 10*3/mm3    Immature Grans, Absolute 0.03 0.00 - 0.05 10*3/mm3    nRBC 0.0 0.0 - 0.2 /100 WBC        Counseling was given to patient  for the following topics: appropriate exercise 3 days a week, disease prevention and healthy eating habits.    Plan of care reviewed with patient at the conclusion of today's visit. Education was provided regarding diagnosis, management, and any prescribed or recommended OTC medications.Patient verbalizes understanding of and agreement with management plan.         Slava Hair MD

## 2021-10-27 NOTE — PROGRESS NOTES
NAME: SKY CARTER   DOS: 10/28/2021  : 1970  PCP: Slava Hair MD    Chief Complaint:  Neck Pain      History of Present Illness: Ms. Carter is a 51 y.o. female who is seen today with chronic neck pain and right arm numbness/tingling.  Patient notes that this is been present for the last several years, however, has continued to worsen.  She notes that she feels like she has to slump to alleviate her neck pain.  She also notes numbness and tingling that ios within her right hand, most notably the fourth-fifth digits.  She notes that certain positions exacerbate/alleviate the symptomatology.  She has attempted Mobic but that increase the patient's blood pressure.  She was recently placed on Celebrex by her PCP to see if this provides better relief.  She has not completed any physical therapy.  She is seen today in evaluation.      Past Medical History:   Diagnosis Date   • Cervical spondylosis    • Herpes zoster without complication 2019   • HSV infection    • Seizure disorder (HCC)     resolved       Past Surgical History:   Procedure Laterality Date   •  SECTION     • HYSTERECTOMY      LSH   • SUBTOTAL HYSTERECTOMY     • TUBAL ABDOMINAL LIGATION               Review of Systems   HENT: Positive for sinus pressure.    Musculoskeletal: Positive for arthralgias, back pain, neck pain and neck stiffness.   Allergic/Immunologic: Positive for environmental allergies.   Neurological: Positive for headaches.   Psychiatric/Behavioral: Positive for sleep disturbance.   All other systems reviewed and are negative.           Medications:    Current Outpatient Medications:   •  celecoxib (CeleBREX) 200 MG capsule, Take 1 capsule by mouth Daily., Disp: 30 capsule, Rfl: 5  •  estrogens, conjugated, (PREMARIN) 0.625 MG tablet, Take 1 tablet by mouth Daily., Disp: 90 tablet, Rfl: 3  •  lisinopril (PRINIVIL,ZESTRIL) 10 MG tablet, Take 1 tablet by mouth Daily., Disp: 30 tablet, Rfl: 5  •   methocarbamol (ROBAXIN) 750 MG tablet, Take 1 tablet by mouth 3 (Three) Times a Day., Disp: 60 tablet, Rfl: 1  •  metoprolol succinate XL (TOPROL-XL) 50 MG 24 hr tablet, Take 1 tablet by mouth Daily., Disp: 90 tablet, Rfl: 3  •  Pseudoephedrine-Ibuprofen (ADVIL COLD/SINUS PO), Take 1 tablet by mouth At Night As Needed., Disp: , Rfl:   •  Scopolamine (Transderm-Scop, 1.5 MG,) 1 MG/3DAYS patch, Place 1 patch on the skin as directed by provider Every 72 (Seventy-Two) Hours., Disp: 4 patch, Rfl: 0  •  valACYclovir (Valtrex) 1000 MG tablet, Take 1 tablet by mouth Daily., Disp: 90 tablet, Rfl: 3    Allergies:  No Known Allergies    Social History     Tobacco Use   • Smoking status: Former Smoker     Packs/day: 0.10     Years: 14.00     Pack years: 1.40     Types: Cigarettes     Start date:      Quit date:      Years since quittin.8   • Smokeless tobacco: Never Used   • Tobacco comment: 2 cigarettes per day   Vaping Use   • Vaping Use: Never used   Substance Use Topics   • Alcohol use: Yes     Comment: once a month   • Drug use: No       Family History   Problem Relation Age of Onset   • Diabetes Father         type 2   • Osteoarthritis Mother    • Breast cancer Neg Hx    • Ovarian cancer Neg Hx        Review of Imaging:  No new imaging to review     Vitals:    10/27/21 1521   BP: 142/90   Temp: 97.6 °F (36.4 °C)     Body mass index is 40.39 kg/m².    Physical Exam  Neurological:      Mental Status: She is oriented to person, place, and time.      Deep Tendon Reflexes:      Reflex Scores:       Tricep reflexes are 2+ on the right side and 2+ on the left side.       Bicep reflexes are 2+ on the right side and 2+ on the left side.      Neurologic Exam     Mental Status   Oriented to person, place, and time.     Motor Exam   Muscle bulk: normal  Overall muscle tone: normal    Strength   Right neck flexion: 5/5  Left neck flexion: 5/5  Right neck extension: 5/5  Left neck extension: 5/5  Right deltoid: 5/5  Left  deltoid: 5/5  Right biceps: 5/5  Left biceps: 5/5  Right triceps: 5/5  Left triceps: 5/5  Right iliopsoas: 5/5  Left iliopsoas: 5/5    Sensory Exam   Light touch normal.   Negative Tinel's at wrist/elbow bilaterally     Gait, Coordination, and Reflexes     Reflexes   Right biceps: 2+  Left biceps: 2+  Right triceps: 2+  Left triceps: 2+  Right Schustre: absent  Left Schuster: absent  Right ankle clonus: absent  Left ankle clonus: absent      Diagnoses/Plan:    Ms. Carter is a 51 y.o. female who is seen today with chronic neck pain and numbness within her right hand.  After lengthy discussion with patient, we will have patient initiate physical therapy to see if they can help with the symptomatology.  In addition, will have patient obtain a EMG/NCT for further evaluation of her right hand numbness.  Encourage patient to continue to see if Celebrex provides her relief but does not affect her blood pressure.  Did provide patient a prescription of Robaxin to see if this helps with her current symptoms.  I will have patient follow-up in approximately 6 weeks for ongoing monitoring and EMG/NCT results signs and symptoms were reviewed with patient that would warrant a sooner call to our clinic and/are 9 1.  She notes understanding and willing to proceed.      Patient's Body mass index is 40.39 kg/m². indicating that she is obese (BMI >30). Obesity-related health conditions include the following: hypertension. Obesity is unchanged. BMI is is above average; BMI management plan is completed. We discussed portion control and increasing exercise..          Vashti Yap PA-C

## 2021-10-28 ENCOUNTER — LAB (OUTPATIENT)
Dept: LAB | Facility: HOSPITAL | Age: 51
End: 2021-10-28

## 2021-10-28 ENCOUNTER — TELEPHONE (OUTPATIENT)
Dept: INTERNAL MEDICINE | Facility: CLINIC | Age: 51
End: 2021-10-28

## 2021-10-28 DIAGNOSIS — R76.8 POSITIVE HEPATITIS C ANTIBODY TEST: Primary | ICD-10-CM

## 2021-10-28 DIAGNOSIS — R76.8 POSITIVE HEPATITIS C ANTIBODY TEST: ICD-10-CM

## 2021-10-28 LAB — HCV AB SER DONR QL: REACTIVE

## 2021-10-28 PROCEDURE — 87522 HEPATITIS C REVRS TRNSCRPJ: CPT

## 2021-10-28 PROCEDURE — 36415 COLL VENOUS BLD VENIPUNCTURE: CPT

## 2021-10-28 NOTE — TELEPHONE ENCOUNTER
Caller: Blanka Carter    Relationship: Self    Best call back number: [phone]    Caller requesting test results: YES    What test was performed: BLOOD WORK    When was the test performed: 10/28/21    Where was the test performed: [facility]

## 2021-10-28 NOTE — PATIENT INSTRUCTIONS

## 2021-10-28 NOTE — TELEPHONE ENCOUNTER
Let her know LDL cholesterol is up slightly, but not bad, at 120.  Blood counts, glucose, electrolytes liver and kidney function tests negative.  Hepatitis C antibody, which was done because the   CDC now recommends everyone over 18 get screened, was positive.  This means she could have contracted hepatitis C.  Before we do anything else, we need to do a confirmatory test, HCV RNA.  If that is positive, I will refer her to ID for treatment.

## 2021-10-30 LAB
HCV GENTYP SERPL NAA+PROBE: NORMAL
HCV RNA SERPL NAA+PROBE-ACNC: NORMAL IU/ML
HCV RNA SERPL NAA+PROBE-LOG IU: NORMAL {LOG_IU}/ML
REF LAB TEST REF RANGE: NORMAL

## 2021-12-08 ENCOUNTER — HOSPITAL ENCOUNTER (OUTPATIENT)
Dept: NEUROLOGY | Facility: HOSPITAL | Age: 51
Discharge: HOME | End: 2021-12-08
Admitting: PHYSICIAN ASSISTANT

## 2021-12-08 ENCOUNTER — OFFICE VISIT (OUTPATIENT)
Dept: NEUROSURGERY | Facility: CLINIC | Age: 51
End: 2021-12-08

## 2021-12-08 ENCOUNTER — OFFICE VISIT (OUTPATIENT)
Dept: OBSTETRICS AND GYNECOLOGY | Facility: CLINIC | Age: 51
End: 2021-12-08

## 2021-12-08 VITALS
DIASTOLIC BLOOD PRESSURE: 82 MMHG | HEIGHT: 65 IN | WEIGHT: 236 LBS | SYSTOLIC BLOOD PRESSURE: 130 MMHG | BODY MASS INDEX: 39.32 KG/M2

## 2021-12-08 VITALS
BODY MASS INDEX: 39.99 KG/M2 | DIASTOLIC BLOOD PRESSURE: 62 MMHG | TEMPERATURE: 96.9 F | WEIGHT: 240 LBS | HEIGHT: 65 IN | SYSTOLIC BLOOD PRESSURE: 108 MMHG

## 2021-12-08 DIAGNOSIS — R20.0 NUMBNESS AND TINGLING IN RIGHT HAND: ICD-10-CM

## 2021-12-08 DIAGNOSIS — R20.2 NUMBNESS AND TINGLING IN RIGHT HAND: ICD-10-CM

## 2021-12-08 DIAGNOSIS — Z12.39 BREAST SCREENING: ICD-10-CM

## 2021-12-08 DIAGNOSIS — Z01.419 WOMEN'S ANNUAL ROUTINE GYNECOLOGICAL EXAMINATION: Primary | ICD-10-CM

## 2021-12-08 DIAGNOSIS — G89.29 NECK PAIN, CHRONIC: Primary | ICD-10-CM

## 2021-12-08 DIAGNOSIS — Z78.0 MENOPAUSE: ICD-10-CM

## 2021-12-08 DIAGNOSIS — Z90.711 HISTORY OF HYSTERECTOMY, SUPRACERVICAL: ICD-10-CM

## 2021-12-08 DIAGNOSIS — M54.2 NECK PAIN, CHRONIC: Primary | ICD-10-CM

## 2021-12-08 PROBLEM — Z90.710 HISTORY OF HYSTERECTOMY: Status: ACTIVE | Noted: 2021-12-08

## 2021-12-08 PROCEDURE — 95886 MUSC TEST DONE W/N TEST COMP: CPT

## 2021-12-08 PROCEDURE — 99213 OFFICE O/P EST LOW 20 MIN: CPT | Performed by: PHYSICIAN ASSISTANT

## 2021-12-08 PROCEDURE — 99396 PREV VISIT EST AGE 40-64: CPT | Performed by: OBSTETRICS & GYNECOLOGY

## 2021-12-08 PROCEDURE — 95908 NRV CNDJ TST 3-4 STUDIES: CPT

## 2021-12-08 RX ORDER — VALACYCLOVIR HYDROCHLORIDE 1 G/1
1000 TABLET, FILM COATED ORAL DAILY
Qty: 90 TABLET | Refills: 3 | Status: SHIPPED | OUTPATIENT
Start: 2021-12-08 | End: 2023-01-16 | Stop reason: SDUPTHER

## 2021-12-08 RX ORDER — CEPHALEXIN 500 MG/1
500 CAPSULE ORAL 4 TIMES DAILY
Qty: 40 CAPSULE | Refills: 0 | Status: SHIPPED | OUTPATIENT
Start: 2021-12-08 | End: 2021-12-18

## 2021-12-08 NOTE — PROGRESS NOTES
NAME: SKY CARTER   DOS: 2021  : 1970  PCP: Slava Hair MD    Chief Complaint:  Neck Pain and Follow-up (EMG/NCV)      History of Present Illness: Ms. Carter is a 51 y.o. female who is seen today with chronic neck pain and right arm numbness/tingling.  The pain has been present for the last several years, however, she notes it is continued to worsen.  If patient slumps she notes that the neck pain is alleviated.  She notes numbness and tingling most notably in her right hand.  Most recently, she has noted that when she sleeps on either side depending on position of arm her whole hand goes numb.  She has remained on Celebrex with minimal relief.  Patient has not completed any formal physical therapy due to her job.  However, she has had over 2 months of exercises including neck stretches.  She is seen today in follow-up with EMG/nerve conduction test.      Past Medical History:   Diagnosis Date   • Cervical spondylosis    • Herpes zoster without complication 2019   • HSV infection    • Hypertension 2020   • Low back pain     Im not sure of the date   • Peripheral neuropathy     Right hand fingers get tingling and numb with excessive use   • Seizure disorder (HCC)     resolved       Past Surgical History:   Procedure Laterality Date   •  SECTION     • HYSTERECTOMY      LSH   • SUBTOTAL HYSTERECTOMY     • TUBAL ABDOMINAL LIGATION               Review of Systems   Constitutional: Negative for activity change, appetite change, chills, diaphoresis, fatigue, fever and unexpected weight change.   HENT: Positive for sinus pressure. Negative for congestion, dental problem, drooling, ear discharge, ear pain, facial swelling, hearing loss, mouth sores, nosebleeds, postnasal drip, rhinorrhea, sneezing, sore throat, tinnitus, trouble swallowing and voice change.    Eyes: Negative for photophobia, pain, discharge, redness, itching and visual disturbance.   Respiratory:  Negative for apnea, cough, choking, chest tightness, shortness of breath, wheezing and stridor.    Cardiovascular: Negative for chest pain, palpitations and leg swelling.   Gastrointestinal: Negative for abdominal distention, abdominal pain, anal bleeding, blood in stool, constipation, diarrhea, nausea, rectal pain and vomiting.   Endocrine: Negative for cold intolerance, heat intolerance, polydipsia, polyphagia and polyuria.   Genitourinary: Negative for decreased urine volume, difficulty urinating, dysuria, enuresis, flank pain, frequency, genital sores, hematuria and urgency.   Musculoskeletal: Positive for arthralgias, back pain, neck pain and neck stiffness. Negative for gait problem, joint swelling and myalgias.   Skin: Negative for color change, pallor, rash and wound.   Allergic/Immunologic: Positive for environmental allergies. Negative for food allergies and immunocompromised state.   Neurological: Positive for headaches. Negative for dizziness, tremors, seizures, syncope, facial asymmetry, speech difficulty, weakness, light-headedness and numbness.   Hematological: Negative for adenopathy. Does not bruise/bleed easily.   Psychiatric/Behavioral: Positive for sleep disturbance. Negative for agitation, behavioral problems, confusion, decreased concentration, dysphoric mood, hallucinations, self-injury and suicidal ideas. The patient is not nervous/anxious and is not hyperactive.    All other systems reviewed and are negative.           Medications:    Current Outpatient Medications:   •  celecoxib (CeleBREX) 200 MG capsule, Take 1 capsule by mouth Daily., Disp: 30 capsule, Rfl: 5  •  cephalexin (Keflex) 500 MG capsule, Take 1 capsule by mouth 4 (Four) Times a Day for 10 days., Disp: 40 capsule, Rfl: 0  •  estrogens, conjugated, (PREMARIN) 0.625 MG tablet, Take 1 tablet by mouth Daily., Disp: 90 tablet, Rfl: 3  •  lisinopril (PRINIVIL,ZESTRIL) 10 MG tablet, Take 1 tablet by mouth Daily., Disp: 30 tablet, Rfl:  5  •  methocarbamol (ROBAXIN) 750 MG tablet, Take 1 tablet by mouth 3 (Three) Times a Day., Disp: 60 tablet, Rfl: 1  •  metoprolol succinate XL (TOPROL-XL) 50 MG 24 hr tablet, Take 1 tablet by mouth Daily., Disp: 90 tablet, Rfl: 3  •  Pseudoephedrine-Ibuprofen (ADVIL COLD/SINUS PO), Take 1 tablet by mouth At Night As Needed., Disp: , Rfl:   •  valACYclovir (Valtrex) 1000 MG tablet, Take 1 tablet by mouth Daily., Disp: 90 tablet, Rfl: 3    Allergies:  No Known Allergies    Social History     Tobacco Use   • Smoking status: Former Smoker     Packs/day: 0.10     Years: 14.00     Pack years: 1.40     Types: Cigarettes     Start date:      Quit date:      Years since quittin.9   • Smokeless tobacco: Never Used   • Tobacco comment: 2 cigarettes per day   Vaping Use   • Vaping Use: Never used   Substance Use Topics   • Alcohol use: Yes     Alcohol/week: 4.0 standard drinks     Types: 4 Standard drinks or equivalent per week     Comment: Monthly   • Drug use: No       Family History   Problem Relation Age of Onset   • Diabetes Father         type 2   • Heart disease Father    • Osteoarthritis Mother    • Arthritis Mother    • Other Mother         Osteoporosis   • Breast cancer Neg Hx    • Ovarian cancer Neg Hx        Review of Imaging:  EMG/nerve conduction test demonstrates normal study.  No evidence of radiculopathy in the right arm.    Vitals:    21 1251   BP: 108/62   Temp: 96.9 °F (36.1 °C)     Body mass index is 40.56 kg/m².    Physical Exam  Neurological:      Mental Status: She is oriented to person, place, and time.      Gait: Gait is intact.       Neurologic Exam     Mental Status   Oriented to person, place, and time.     Motor Exam   Muscle bulk: normal  Overall muscle tone: normal    Strength   Right deltoid: 5/5  Left deltoid: 5/5  Right biceps: 5/5  Left biceps: 5/5  Right triceps: 5/5  Left triceps: 5/5    Gait, Coordination, and Reflexes     Gait  Gait: normal    Reflexes   Right Schuster:  absent  Left Schuster: absent      Diagnoses/Plan:    Ms. Carter is a 51 y.o. female who is seen today in follow-up with chronic neck pain with numbness in her right extremity.  After reviewing the patient's recent study, EMG/nerve conduction test demonstrates no cervical radiculopathy or median/ulnar neuropathy.  Due to the patient's ongoing symptoms, we will complete an MRI of the cervical spine for completeness to see if surgery or injections may provide patient any relief.  It was noted that this could also be vascular in nature.  Patient notes understanding and willing to proceed.      Patient's Body mass index is 40.56 kg/m². indicating that she is obese (BMI >30). Obesity-related health conditions include the following: osteoarthritis. Obesity is unchanged. BMI is is above average; BMI management plan is completed.              Vashti Yap PA-C

## 2021-12-08 NOTE — PROGRESS NOTES
Subjective     Chief Complaint   Patient presents with   • Gynecologic Exam     annual refill valtrex discuss hrt today        Blanka Carter is a 51 y.o. year old  presenting to be seen for her annual exam.      She is not sexually active.  In the past 12 months there have not been new sexual partners.    She would not like to be screened for STD's at today's exam.     She exercises regularly: no.  She wears her seat belt: yes.  She has concerns about domestic violence: no.  She has noticed changes in height: yes  Health Maintenance for Postmenopausal Women  Menopause is a normal process in which your ability to get pregnant comes to an end. This process happens slowly over many months or years, usually between the ages of 48 and 55. Menopause is complete when you have missed your menstrual periods for 12 months.  It is important to talk with your health care provider about some of the most common conditions that affect women after menopause (postmenopausal women). These include heart disease, cancer, and bone loss (osteoporosis). Adopting a healthy lifestyle and getting preventive care can help to promote your health and wellness. The actions you take can also lower your chances of developing some of these common conditions.  What should I know about menopause?  During menopause, you may get a number of symptoms, such as:  · Hot flashes. These can be moderate or severe.  · Night sweats.  · Decrease in sex drive.  · Mood swings.  · Headaches.  · Tiredness.  · Irritability.  · Memory problems.  · Insomnia.  Choosing to treat or not to treat these symptoms is a decision that you make with your health care provider.  Do I need hormone replacement therapy?  · Hormone replacement therapy is effective in treating symptoms that are caused by menopause, such as hot flashes and night sweats.  · Hormone replacement carries certain risks, especially as you become older. If you are thinking about using estrogen or  estrogen with progestin, discuss the benefits and risks with your health care provider.  What is my risk for heart disease and stroke?  The risk of heart disease, heart attack, and stroke increases as you age. One of the causes may be a change in the body's hormones during menopause. This can affect how your body uses dietary fats, triglycerides, and cholesterol. Heart attack and stroke are medical emergencies. There are many things that you can do to help prevent heart disease and stroke.  Watch your blood pressure  · High blood pressure causes heart disease and increases the risk of stroke. This is more likely to develop in people who have high blood pressure readings, are of  descent, or are overweight.  · Have your blood pressure checked:  ? Every 3-5 years if you are 18-39 years of age.  ? Every year if you are 40 years old or older.  Eat a healthy diet       · Eat a diet that includes plenty of vegetables, fruits, low-fat dairy products, and lean protein.  · Do not eat a lot of foods that are high in solid fats, added sugars, or sodium.     Get regular exercise  Get regular exercise. This is one of the most important things you can do for your health. Most adults should:  · Try to exercise for at least 150 minutes each week. The exercise should increase your heart rate and make you sweat (moderate-intensity exercise).  · Try to do strengthening exercises at least twice each week. Do these in addition to the moderate-intensity exercise.  · Spend less time sitting. Even light physical activity can be beneficial.  Other tips  · Work with your health care provider to achieve or maintain a healthy weight.  · Do not use any products that contain nicotine or tobacco, such as cigarettes, e-cigarettes, and chewing tobacco. If you need help quitting, ask your health care provider.  · Know your numbers. Ask your health care provider to check your cholesterol and your blood sugar (glucose). Continue to have your  blood tested as directed by your health care provider.  Do I need screening for cancer?  Depending on your health history and family history, you may need to have cancer screening at different stages of your life. This may include screening for:  · Breast cancer.  · Cervical cancer.  · Lung cancer.  · Colorectal cancer.  What is my risk for osteoporosis?  After menopause, you may be at increased risk for osteoporosis. Osteoporosis is a condition in which bone destruction happens more quickly than new bone creation. To help prevent osteoporosis or the bone fractures that can happen because of osteoporosis, you may take the following actions:  · If you are 19-50 years old, get at least 1,000 mg of calcium and at least 600 mg of vitamin D per day.  · If you are older than age 50 but younger than age 70, get at least 1,200 mg of calcium and at least 600 mg of vitamin D per day.  · If you are older than age 70, get at least 1,200 mg of calcium and at least 800 mg of vitamin D per day.  Smoking and drinking excessive alcohol increase the risk of osteoporosis. Eat foods that are rich in calcium and vitamin D, and do weight-bearing exercises several times each week as directed by your health care provider.  How does menopause affect my mental health?  Depression may occur at any age, but it is more common as you become older. Common symptoms of depression include:  · Low or sad mood.  · Changes in sleep patterns.  · Changes in appetite or eating patterns.  · Feeling an overall lack of motivation or enjoyment of activities that you previously enjoyed.  · Frequent crying spells.  Talk with your health care provider if you think that you are experiencing depression.  General instructions  See your health care provider for regular wellness exams and vaccines. This may include:  · Scheduling regular health, dental, and eye exams.  · Getting and maintaining your vaccines. These include:  ? Influenza vaccine. Get this vaccine each  "year before the flu season begins.  ? Pneumonia vaccine.  ? Shingles vaccine.  ? Tetanus, diphtheria, and pertussis (Tdap) booster vaccine.  Your health care provider may also recommend other immunizations.  Tell your health care provider if you have ever been abused or do not feel safe at home.  Summary  · Menopause is a normal process in which your ability to get pregnant comes to an end.  · This condition causes hot flashes, night sweats, decreased interest in sex, mood swings, headaches, or lack of sleep.  · Treatment for this condition may include hormone replacement therapy.  · Take actions to keep yourself healthy, including exercising regularly, eating a healthy diet, watching your weight, and checking your blood pressure and blood sugar levels.  · Get screened for cancer and depression. Make sure that you are up to date with all your vaccines.    GYN screening history:  · Last pap: she reports her last PAP was normal  · Last mammogram: she reports her last mammogram was normal  · Last fasting lipid profile: she reports her last lipid panel was normal  · Last colonoscopy: diverticulosis 2019  · Last DEXA: she reports her last DEXA was normal.    We discussed the use of ERT and general prescribing guidelines. We discussed the risk of ERT in general and those associated with ling-term use.    The following portions of the patient's history were reviewed and updated as appropriate:vital signs, allergies, current medications, past medical history, past social history, past surgical history and problem list.    Review of Systems  Pertinent items are noted in HPI.     Physical Exam    Objective     /82   Ht 163.8 cm (64.5\")   Wt 107 kg (236 lb)   LMP 12/01/2013 Comment: hysterectomy  Breastfeeding No   BMI 39.88 kg/m²     General:  well developed; well nourished  no acute distress  obese - Body mass index is 39.88 kg/m².   Constitutional: obese and healthy   Skin:  No suspicious lesions seen   Thyroid: " normal to inspection and palpation   Lungs:  breathing is unlabored  clear to auscultation bilaterally   Heart:  regular rate and rhythm, S1, S2 normal, no murmur, click, rub or gallop   Breasts:  Examined in supine position  Symmetric without masses or skin dimpling  Nipples normal without inversion, lesions or discharge  There are no palpable axillary nodes   Abdomen: soft, non-tender; no masses  no umbilical or inginual hernias are present  no hepato-splenomegaly   Pelvis: Clinical staff was present for exam  External genitalia:  normal appearance of the external genitalia including Bartholin's and [place]'s glands. folliculitis of right Labia majorum with minial induration  :  urethral meatus normal; urethral hypermobility is absent.  Vaginal:  normal pink mucosa without prolapse or lesions.  Cervix:  normal appearance pap done  Uterus:  absent  Adnexa:  normal bimanual exam of the adnexa.   Musculoskeletal: negative   Neuro: normal without focal findings, mental status, speech normal, alert and oriented x3 and LOBO   Psych: oriented to time, place and person, mood and affect are within normal limits, pt is a good historian; no memory problems were noted       Lab Review   CBC and CMP    Imaging  DEXA  Mammogram report    Assessment/Plan     ASSESSMENT  1. Women's annual routine gynecological examination    2. Breast screening    3. History of hysterectomy, supracervical    4. Menopause    5.      Vulvar folliculitis , will provide antibiotic prescription should spontaneous roslution be delayed    PLAN  Orders Placed This Encounter   Procedures   • Mammo Screening Digital Tomosynthesis Bilateral With CAD     Standing Status:   Future     Standing Expiration Date:   12/8/2022     Order Specific Question:   Reason for Exam:     Answer:   screen     New Medications Ordered This Visit   Medications   • estrogens, conjugated, (PREMARIN) 0.625 MG tablet     Sig: Take 1 tablet by mouth Daily.     Dispense:  90 tablet      Refill:  3   • valACYclovir (Valtrex) 1000 MG tablet     Sig: Take 1 tablet by mouth Daily.     Dispense:  90 tablet     Refill:  3   • cephalexin (Keflex) 500 MG capsule     Sig: Take 1 capsule by mouth 4 (Four) Times a Day for 10 days.     Dispense:  40 capsule     Refill:  0         Follow up: 1 year(s)         This note was electronically signed.    Jack Rodney MD  December 8, 2021

## 2021-12-08 NOTE — PATIENT INSTRUCTIONS
Health Maintenance for Postmenopausal Women  Menopause is a normal process in which your ability to get pregnant comes to an end. This process happens slowly over many months or years, usually between the ages of 48 and 55. Menopause is complete when you have missed your menstrual periods for 12 months.  It is important to talk with your health care provider about some of the most common conditions that affect women after menopause (postmenopausal women). These include heart disease, cancer, and bone loss (osteoporosis). Adopting a healthy lifestyle and getting preventive care can help to promote your health and wellness. The actions you take can also lower your chances of developing some of these common conditions.  What should I know about menopause?  During menopause, you may get a number of symptoms, such as:  · Hot flashes. These can be moderate or severe.  · Night sweats.  · Decrease in sex drive.  · Mood swings.  · Headaches.  · Tiredness.  · Irritability.  · Memory problems.  · Insomnia.  Choosing to treat or not to treat these symptoms is a decision that you make with your health care provider.  Do I need hormone replacement therapy?  · Hormone replacement therapy is effective in treating symptoms that are caused by menopause, such as hot flashes and night sweats.  · Hormone replacement carries certain risks, especially as you become older. If you are thinking about using estrogen or estrogen with progestin, discuss the benefits and risks with your health care provider.  What is my risk for heart disease and stroke?  The risk of heart disease, heart attack, and stroke increases as you age. One of the causes may be a change in the body's hormones during menopause. This can affect how your body uses dietary fats, triglycerides, and cholesterol. Heart attack and stroke are medical emergencies. There are many things that you can do to help prevent heart disease and stroke.  Watch your blood pressure  · High  blood pressure causes heart disease and increases the risk of stroke. This is more likely to develop in people who have high blood pressure readings, are of  descent, or are overweight.  · Have your blood pressure checked:  ? Every 3-5 years if you are 18-39 years of age.  ? Every year if you are 40 years old or older.  Eat a healthy diet    · Eat a diet that includes plenty of vegetables, fruits, low-fat dairy products, and lean protein.  · Do not eat a lot of foods that are high in solid fats, added sugars, or sodium.    Get regular exercise  Get regular exercise. This is one of the most important things you can do for your health. Most adults should:  · Try to exercise for at least 150 minutes each week. The exercise should increase your heart rate and make you sweat (moderate-intensity exercise).  · Try to do strengthening exercises at least twice each week. Do these in addition to the moderate-intensity exercise.  · Spend less time sitting. Even light physical activity can be beneficial.  Other tips  · Work with your health care provider to achieve or maintain a healthy weight.  · Do not use any products that contain nicotine or tobacco, such as cigarettes, e-cigarettes, and chewing tobacco. If you need help quitting, ask your health care provider.  · Know your numbers. Ask your health care provider to check your cholesterol and your blood sugar (glucose). Continue to have your blood tested as directed by your health care provider.  Do I need screening for cancer?  Depending on your health history and family history, you may need to have cancer screening at different stages of your life. This may include screening for:  · Breast cancer.  · Cervical cancer.  · Lung cancer.  · Colorectal cancer.  What is my risk for osteoporosis?  After menopause, you may be at increased risk for osteoporosis. Osteoporosis is a condition in which bone destruction happens more quickly than new bone creation. To help prevent  osteoporosis or the bone fractures that can happen because of osteoporosis, you may take the following actions:  · If you are 19-50 years old, get at least 1,000 mg of calcium and at least 600 mg of vitamin D per day.  · If you are older than age 50 but younger than age 70, get at least 1,200 mg of calcium and at least 600 mg of vitamin D per day.  · If you are older than age 70, get at least 1,200 mg of calcium and at least 800 mg of vitamin D per day.  Smoking and drinking excessive alcohol increase the risk of osteoporosis. Eat foods that are rich in calcium and vitamin D, and do weight-bearing exercises several times each week as directed by your health care provider.  How does menopause affect my mental health?  Depression may occur at any age, but it is more common as you become older. Common symptoms of depression include:  · Low or sad mood.  · Changes in sleep patterns.  · Changes in appetite or eating patterns.  · Feeling an overall lack of motivation or enjoyment of activities that you previously enjoyed.  · Frequent crying spells.  Talk with your health care provider if you think that you are experiencing depression.  General instructions  See your health care provider for regular wellness exams and vaccines. This may include:  · Scheduling regular health, dental, and eye exams.  · Getting and maintaining your vaccines. These include:  ? Influenza vaccine. Get this vaccine each year before the flu season begins.  ? Pneumonia vaccine.  ? Shingles vaccine.  ? Tetanus, diphtheria, and pertussis (Tdap) booster vaccine.  Your health care provider may also recommend other immunizations.  Tell your health care provider if you have ever been abused or do not feel safe at home.  Summary  · Menopause is a normal process in which your ability to get pregnant comes to an end.  · This condition causes hot flashes, night sweats, decreased interest in sex, mood swings, headaches, or lack of sleep.  · Treatment for this  condition may include hormone replacement therapy.  · Take actions to keep yourself healthy, including exercising regularly, eating a healthy diet, watching your weight, and checking your blood pressure and blood sugar levels.  · Get screened for cancer and depression. Make sure that you are up to date with all your vaccines.  This information is not intended to replace advice given to you by your health care provider. Make sure you discuss any questions you have with your health care provider.  Document Revised: 12/11/2019 Document Reviewed: 12/11/2019  [employer] Patient Education © 2021 [employer]

## 2021-12-18 ENCOUNTER — HOSPITAL ENCOUNTER (OUTPATIENT)
Dept: MRI IMAGING | Facility: HOSPITAL | Age: 51
Discharge: HOME | End: 2021-12-18
Admitting: PHYSICIAN ASSISTANT

## 2021-12-18 DIAGNOSIS — G89.29 NECK PAIN, CHRONIC: ICD-10-CM

## 2021-12-18 DIAGNOSIS — R20.2 NUMBNESS AND TINGLING IN RIGHT HAND: ICD-10-CM

## 2021-12-18 DIAGNOSIS — M54.2 NECK PAIN, CHRONIC: ICD-10-CM

## 2021-12-18 DIAGNOSIS — R20.0 NUMBNESS AND TINGLING IN RIGHT HAND: ICD-10-CM

## 2021-12-18 PROCEDURE — 72141 MRI NECK SPINE W/O DYE: CPT

## 2021-12-29 ENCOUNTER — OFFICE VISIT (OUTPATIENT)
Dept: NEUROSURGERY | Facility: CLINIC | Age: 51
End: 2021-12-29

## 2021-12-29 VITALS
WEIGHT: 240 LBS | SYSTOLIC BLOOD PRESSURE: 122 MMHG | BODY MASS INDEX: 39.99 KG/M2 | HEIGHT: 65 IN | DIASTOLIC BLOOD PRESSURE: 78 MMHG | TEMPERATURE: 96.9 F

## 2021-12-29 DIAGNOSIS — R20.2 NUMBNESS AND TINGLING IN RIGHT HAND: ICD-10-CM

## 2021-12-29 DIAGNOSIS — G89.29 NECK PAIN, CHRONIC: Primary | ICD-10-CM

## 2021-12-29 DIAGNOSIS — M54.2 NECK PAIN, CHRONIC: Primary | ICD-10-CM

## 2021-12-29 DIAGNOSIS — R20.0 NUMBNESS AND TINGLING IN RIGHT HAND: ICD-10-CM

## 2021-12-29 PROCEDURE — 99214 OFFICE O/P EST MOD 30 MIN: CPT | Performed by: PHYSICIAN ASSISTANT

## 2021-12-29 NOTE — PROGRESS NOTES
NAME: SKY CARTER   DOS: 2021  : 1970  PCP: Slava Hair MD    Chief Complaint:  Neck Pain      History of Present Illness: Ms. Carter is a 51 y.o. female  who is seen today with chronic neck pain and right arm numbness/tingling.  The pain has been present for the last several years, however, she notes it is continued to worsen.  If patient slumps she notes that the neck pain is alleviated.  She notes numbness and tingling most notably in her right hand but states that it alternates between her 1-2 digits or 4-5th digits.  Most recently, she has noted that when she sleeps on either side depending on position of arm her whole hand goes numb.  She feels as if the numbness is more related to position. She can stretch her arm to provide relief. She has remained on Celebrex with minimal relief.  Patient has not completed any formal physical therapy due to her job.  However, she has had over 2 months of exercises including neck stretches. She is seen today with cervical MRI.       Past Medical History:   Diagnosis Date   • Cervical spondylosis    • Herpes zoster without complication 2019   • HSV infection    • Hypertension 2020   • Low back pain     Im not sure of the date   • Peripheral neuropathy     Right hand fingers get tingling and numb with excessive use   • Seizure disorder (HCC)     resolved       Past Surgical History:   Procedure Laterality Date   •  SECTION     • HYSTERECTOMY      LSH   • SUBTOTAL HYSTERECTOMY     • TUBAL ABDOMINAL LIGATION               Review of Systems   Musculoskeletal: Positive for neck pain and neck stiffness.   All other systems reviewed and are negative.           Medications:    Current Outpatient Medications:   •  celecoxib (CeleBREX) 200 MG capsule, Take 1 capsule by mouth Daily., Disp: 30 capsule, Rfl: 5  •  estrogens, conjugated, (PREMARIN) 0.625 MG tablet, Take 1 tablet by mouth Daily., Disp: 90 tablet, Rfl: 3  •   lisinopril (PRINIVIL,ZESTRIL) 10 MG tablet, Take 1 tablet by mouth Daily., Disp: 30 tablet, Rfl: 5  •  methocarbamol (ROBAXIN) 750 MG tablet, Take 1 tablet by mouth 3 (Three) Times a Day., Disp: 60 tablet, Rfl: 1  •  metoprolol succinate XL (TOPROL-XL) 50 MG 24 hr tablet, Take 1 tablet by mouth Daily., Disp: 90 tablet, Rfl: 3  •  Pseudoephedrine-Ibuprofen (ADVIL COLD/SINUS PO), Take 1 tablet by mouth At Night As Needed., Disp: , Rfl:   •  valACYclovir (Valtrex) 1000 MG tablet, Take 1 tablet by mouth Daily., Disp: 90 tablet, Rfl: 3  •  Zoster Vac Recomb Adjuvanted 50 MCG/0.5ML reconstituted suspension, Inject  into the appropriate muscle as directed by prescriber. Repeat in 2-6 months, Disp: 0.5 mL, Rfl: 1    Allergies:  No Known Allergies    Social History     Tobacco Use   • Smoking status: Former Smoker     Packs/day: 0.10     Years: 14.00     Pack years: 1.40     Types: Cigarettes     Start date:      Quit date:      Years since quittin.9   • Smokeless tobacco: Never Used   • Tobacco comment: 2 cigarettes per day   Vaping Use   • Vaping Use: Never used   Substance Use Topics   • Alcohol use: Yes     Alcohol/week: 4.0 standard drinks     Types: 4 Standard drinks or equivalent per week     Comment: Monthly   • Drug use: No       Family History   Problem Relation Age of Onset   • Diabetes Father         type 2   • Heart disease Father    • Osteoarthritis Mother    • Arthritis Mother    • Other Mother         Osteoporosis   • Breast cancer Neg Hx    • Ovarian cancer Neg Hx        Review of Imaging:  MRI of the cervical spine was reviewed along with the corresponding radiology report.  Study demonstrates degenerative changes throughout.  There is also noted disc herniation at C5-6 with mild narrowing of the right neural foramen.    Vitals:    21 1237   BP: 122/78   Temp: 96.9 °F (36.1 °C)     Body mass index is 40.56 kg/m².    Physical Exam  Constitutional:       Appearance: Normal appearance.    HENT:      Head: Normocephalic and atraumatic.   Eyes:      Extraocular Movements: Extraocular movements intact.   Skin:     General: Skin is warm and dry.   Neurological:      Mental Status: She is alert and oriented to person, place, and time.      Gait: Gait is intact.   Psychiatric:         Mood and Affect: Mood normal.         Behavior: Behavior normal.       Neurologic Exam     Mental Status   Oriented to person, place, and time.     Motor Exam   Muscle bulk: normal  Overall muscle tone: normal    Strength   Right deltoid: 5/5  Left deltoid: 5/5  Right biceps: 5/5  Left biceps: 5/5  Right triceps: 5/5  Left triceps: 5/5  Right wrist flexion: 5/5  Left wrist flexion: 5/5  Right wrist extension: 5/5  Left wrist extension: 5/5    Gait, Coordination, and Reflexes     Gait  Gait: normal    Reflexes   Right ankle clonus: absent  Left ankle clonus: absent      Diagnoses/Plan:    Ms. Carter is a 51 y.o. female who is seen today in follow-up with chronic neck pain and numbness within her right extremity.  After reviewing her recent MRI, there is some degenerative changes throughout and noted disc herniation at C5-6 with mild narrowing of the neural foramen.  However, patient's symptoms are more positional and are not in a particular pattern consistently.  Therefore, had lengthy conversation with patient in regards to next appropriate steps.  It was deemed appropriate for her to attempt to pursue physical therapy, massage therapy, acupuncture, or other such interventions.  Did provide her potential for pain management but she deferred at this time.  Patient can follow-up with us on an as-needed basis.  She notes understanding and willing to proceed.      Patient's Body mass index is 40.56 kg/m². indicating that she is obese (BMI >30). Obesity-related health conditions include the following: osteoarthritis. Obesity is unchanged. BMI is is above average; BMI management plan is completed.          Vashti Yap  ROQUE

## 2022-01-06 RX ORDER — LISINOPRIL 10 MG/1
10 TABLET ORAL DAILY
Qty: 30 TABLET | Refills: 5 | Status: SHIPPED | OUTPATIENT
Start: 2022-01-06 | End: 2022-03-30

## 2022-01-06 RX ORDER — CEPHALEXIN 500 MG/1
500 CAPSULE ORAL 4 TIMES DAILY
Qty: 40 CAPSULE | Refills: 0 | OUTPATIENT
Start: 2022-01-06 | End: 2022-01-16

## 2022-02-23 ENCOUNTER — HOSPITAL ENCOUNTER (OUTPATIENT)
Dept: MAMMOGRAPHY | Facility: HOSPITAL | Age: 52
Discharge: HOME | End: 2022-02-23
Admitting: OBSTETRICS & GYNECOLOGY

## 2022-02-23 DIAGNOSIS — Z12.39 BREAST SCREENING: ICD-10-CM

## 2022-02-23 PROCEDURE — 77063 BREAST TOMOSYNTHESIS BI: CPT | Performed by: RADIOLOGY

## 2022-02-23 PROCEDURE — 77063 BREAST TOMOSYNTHESIS BI: CPT

## 2022-02-23 PROCEDURE — 77067 SCR MAMMO BI INCL CAD: CPT

## 2022-02-23 PROCEDURE — 77067 SCR MAMMO BI INCL CAD: CPT | Performed by: RADIOLOGY

## 2022-03-30 ENCOUNTER — OFFICE VISIT (OUTPATIENT)
Dept: INTERNAL MEDICINE | Facility: CLINIC | Age: 52
End: 2022-03-30

## 2022-03-30 VITALS
BODY MASS INDEX: 41.32 KG/M2 | DIASTOLIC BLOOD PRESSURE: 70 MMHG | TEMPERATURE: 98.4 F | SYSTOLIC BLOOD PRESSURE: 122 MMHG | HEART RATE: 60 BPM | WEIGHT: 242 LBS | HEIGHT: 64 IN

## 2022-03-30 DIAGNOSIS — K21.9 GASTROESOPHAGEAL REFLUX DISEASE, UNSPECIFIED WHETHER ESOPHAGITIS PRESENT: ICD-10-CM

## 2022-03-30 DIAGNOSIS — R05.3 PERSISTENT COUGH: Primary | ICD-10-CM

## 2022-03-30 DIAGNOSIS — I10 ESSENTIAL HYPERTENSION: ICD-10-CM

## 2022-03-30 PROCEDURE — 99214 OFFICE O/P EST MOD 30 MIN: CPT | Performed by: NURSE PRACTITIONER

## 2022-03-30 RX ORDER — OMEPRAZOLE 40 MG/1
40 CAPSULE, DELAYED RELEASE ORAL DAILY
Qty: 30 CAPSULE | Refills: 2 | Status: SHIPPED | OUTPATIENT
Start: 2022-03-30 | End: 2022-05-18 | Stop reason: SDUPTHER

## 2022-05-18 ENCOUNTER — OFFICE VISIT (OUTPATIENT)
Dept: INTERNAL MEDICINE | Facility: CLINIC | Age: 52
End: 2022-05-18

## 2022-05-18 VITALS
HEART RATE: 72 BPM | BODY MASS INDEX: 40.62 KG/M2 | DIASTOLIC BLOOD PRESSURE: 84 MMHG | SYSTOLIC BLOOD PRESSURE: 126 MMHG | WEIGHT: 243.8 LBS | HEIGHT: 65 IN

## 2022-05-18 DIAGNOSIS — K21.9 GASTROESOPHAGEAL REFLUX DISEASE, UNSPECIFIED WHETHER ESOPHAGITIS PRESENT: ICD-10-CM

## 2022-05-18 DIAGNOSIS — I10 ESSENTIAL HYPERTENSION: ICD-10-CM

## 2022-05-18 DIAGNOSIS — R05.3 PERSISTENT COUGH: Primary | ICD-10-CM

## 2022-05-18 PROCEDURE — 99214 OFFICE O/P EST MOD 30 MIN: CPT | Performed by: NURSE PRACTITIONER

## 2022-05-18 RX ORDER — OMEPRAZOLE 40 MG/1
40 CAPSULE, DELAYED RELEASE ORAL DAILY
Qty: 30 CAPSULE | Refills: 2 | Status: SHIPPED | OUTPATIENT
Start: 2022-05-18 | End: 2022-12-23

## 2022-05-18 NOTE — PATIENT INSTRUCTIONS
May need pulmonology work-up pending response to Breo.  PFT unavailable in our office currently secondary to COVID pandemic.      Continue medications as prescribed.  Check BP at home and keep a log for your next visit.    In general, adults should engage in aerobic physical activity 3-4 times a week with each session lasting an average of 40 minutes.  Goal for 150 minutes per week total.  Moderate (brisk walking or jogging) to vigorous (running or biking) physical activity is recommended.  There are many helpful strategies for heart-healthy eating, including the DASH diet and the [employer]'s Choose My Plate.   Patients with high blood pressure should consume no more than 2,400 mg of sodium a day, ideally reducing sodium intake to 1,500 mg a day. However, even reducing sodium intake in one's current diet by 1,000 mg each day can help lower blood pressure.    Limit alcohol consumption.    Information obtained from the American College of Cardiology and American Heart Association.    Eat small meals, avoid eating 2-3 hours prior to lying down. Avoid foods that trigger your symptoms.  Common food triggers include chocolate, peppermint, citrus, onions, spicy foods, and foods high in fat.  Elevate your bed to a minimum of 6-8 inches using wooden blocks below the feet of the head of the bed.  Lose weight.  Avoid caffeine and alcohol.

## 2022-05-18 NOTE — PROGRESS NOTES
Blanka Carter  1970  [id number]  Patient Care Team:  Slava Hair MD as PCP - General  Slava Hair MD as PCP - Family Medicine    Blanka Carter is a pleasant 52 y.o. female who presents for evaluation of Hypertension (Follow up) and Cough (1 mo follow up - may be some better but not much)    Chief Complaint   Patient presents with   • Hypertension     Follow up   • Cough     1 mo follow up - may be some better but not much       HPI:   Blanka is here for a 1 month for cough.  The cough has been dry and present about 5 months now.  Last month we added omeprazole 40 mg daily which has resolved her reflux symptoms and improve the cough by about 25%.  She still has quite a bit of coughing when she is laughing or talking.  No history of respiratory disease.    Blood pressure has remained stable on metoprolol only.  Past Medical History:   Diagnosis Date   • Cervical spondylosis    • Herpes zoster without complication 2019   • HSV infection    • Hypertension 2020   • Low back pain     Im not sure of the date   • Peripheral neuropathy     Right hand fingers get tingling and numb with excessive use   • Seizure disorder (HCC)     resolved     Past Surgical History:   Procedure Laterality Date   •  SECTION     • HYSTERECTOMY      LSH   • SUBTOTAL HYSTERECTOMY     • TUBAL ABDOMINAL LIGATION       Family History   Problem Relation Age of Onset   • Diabetes Father         type 2   • Heart disease Father    • Osteoarthritis Mother    • Arthritis Mother    • Other Mother         Osteoporosis   • Breast cancer Neg Hx    • Ovarian cancer Neg Hx      Social History     Tobacco Use   Smoking Status Former Smoker   • Packs/day: 0.10   • Years: 14.00   • Pack years: 1.40   • Types: Cigarettes   • Start date:    • Quit date:    • Years since quittin.3   Smokeless Tobacco Never Used   Tobacco Comment    2 cigarettes per day     No Known Allergies    Current  "Outpatient Medications:   •  celecoxib (CeleBREX) 200 MG capsule, Take 1 capsule by mouth Daily., Disp: 30 capsule, Rfl: 5  •  estrogens, conjugated, (PREMARIN) 0.625 MG tablet, Take 1 tablet by mouth Daily., Disp: 90 tablet, Rfl: 3  •  metoprolol succinate XL (TOPROL-XL) 50 MG 24 hr tablet, Take 1 tablet by mouth Daily., Disp: 90 tablet, Rfl: 2  •  omeprazole (priLOSEC) 40 MG capsule, Take 1 capsule by mouth Daily., Disp: 30 capsule, Rfl: 2  •  Pseudoephedrine-Ibuprofen (ADVIL COLD/SINUS PO), Take 1 tablet by mouth At Night As Needed., Disp: , Rfl:   •  valACYclovir (Valtrex) 1000 MG tablet, Take 1 tablet by mouth Daily., Disp: 90 tablet, Rfl: 3  •  Fluticasone Furoate-Vilanterol (Breo Ellipta) 100-25 MCG/INH inhaler, Inhale 1 puff Daily., Disp: 1 each, Rfl: 0    Review of Systems  Review of systems was completed, and pertinent findings are noted in the HPI.  /84 (BP Location: Left arm, Patient Position: Sitting, Cuff Size: Large Adult)   Pulse 72   Ht 163.8 cm (64.5\")   Wt 111 kg (243 lb 12.8 oz)   LMP 12/01/2013 Comment: hysterectomy  BMI 41.20 kg/m²     Physical Exam  Constitutional:       Appearance: She is well-developed. She is morbidly obese.   HENT:      Head: Normocephalic and atraumatic.      Comments: *wearing mask  Eyes:      Conjunctiva/sclera: Conjunctivae normal.      Pupils: Pupils are equal, round, and reactive to light.   Cardiovascular:      Rate and Rhythm: Normal rate and regular rhythm.      Pulses: Normal pulses.      Heart sounds: Normal heart sounds.   Pulmonary:      Effort: Pulmonary effort is normal.      Breath sounds: Normal breath sounds.   Musculoskeletal:         General: Normal range of motion.      Cervical back: Normal range of motion and neck supple.   Skin:     General: Skin is warm and dry.   Neurological:      Mental Status: She is alert and oriented to person, place, and time.   Psychiatric:         Mood and Affect: Mood normal.         Behavior: Behavior normal.  "        Thought Content: Thought content normal.         Judgment: Judgment normal.         Procedures    PHQ-9 Total Score:      Assessment/Plan:  Diagnoses and all orders for this visit:    1. Persistent cough (Primary)  Comments:  Trial Breo 100 mg 1 time daily x2 weeks, email update on status    2. Essential hypertension  Comments:  Continues to be managed well on metoprolol only, encourage regular exercise    3. Gastroesophageal reflux disease, unspecified whether esophagitis present  Comments:  Continue omeprazole 40 mg daily, avoid triggers, overeating, and laying down after meals    Other orders  -     omeprazole (priLOSEC) 40 MG capsule; Take 1 capsule by mouth Daily.  Dispense: 30 capsule; Refill: 2  -     Fluticasone Furoate-Vilanterol (Breo Ellipta) 100-25 MCG/INH inhaler; Inhale 1 puff Daily.  Dispense: 1 each; Refill: 0       Patient Instructions   May need pulmonology work-up pending response to Breo.  PFT unavailable in our office currently secondary to COVID pandemic.    Plan of care reviewed with patient at the conclusion of today's visit. Education was provided regarding diagnosis, management and any prescribed or recommended OTC medications.  Patient verbalizes understanding of and agreement with management plan.    Return in about 1 month (around 6/18/2022).    Dictated Utilizing Dragon Dictation.    ADRIEN Hammer          Answers for HPI/ROS submitted by the patient on 5/16/2022  What is the primary reason for your visit?: Cough  Chronicity: recurrent  Onset: more than 1 month ago  Progression since onset: waxing and waning  Frequency: every few hours  Cough characteristics: non-productive

## 2022-06-22 NOTE — TELEPHONE ENCOUNTER
Rx Refill Note  Requested Prescriptions     Pending Prescriptions Disp Refills   • Fluticasone Furoate-Vilanterol (Breo Ellipta) 100-25 MCG/INH inhaler 1 each 0     Sig: Inhale 1 puff Daily.      Last office visit with prescribing clinician: 5/18/2022      Next office visit with prescribing clinician: Visit date not found            Sophie Ware RN  06/22/22, 12:28 EDT

## 2022-08-13 ENCOUNTER — HOSPITAL ENCOUNTER (EMERGENCY)
Facility: HOSPITAL | Age: 52
Discharge: HOME | End: 2022-08-13
Attending: EMERGENCY MEDICINE | Admitting: EMERGENCY MEDICINE

## 2022-08-13 ENCOUNTER — APPOINTMENT (OUTPATIENT)
Dept: GENERAL RADIOLOGY | Facility: HOSPITAL | Age: 52
End: 2022-08-13

## 2022-08-13 VITALS
DIASTOLIC BLOOD PRESSURE: 101 MMHG | HEIGHT: 64 IN | TEMPERATURE: 98.2 F | SYSTOLIC BLOOD PRESSURE: 167 MMHG | WEIGHT: 240 LBS | RESPIRATION RATE: 18 BRPM | BODY MASS INDEX: 40.97 KG/M2 | OXYGEN SATURATION: 100 % | HEART RATE: 81 BPM

## 2022-08-13 DIAGNOSIS — S80.02XA CONTUSION OF LEFT KNEE, INITIAL ENCOUNTER: Primary | ICD-10-CM

## 2022-08-13 PROCEDURE — 99283 EMERGENCY DEPT VISIT LOW MDM: CPT

## 2022-08-13 PROCEDURE — 73560 X-RAY EXAM OF KNEE 1 OR 2: CPT

## 2022-08-14 NOTE — DISCHARGE INSTRUCTIONS
Patient to rest, ice, compression and elevate extremity.  Wear your Ace wrap.  Alternate Tylenol and ibuprofen.

## 2022-08-14 NOTE — ED PROVIDER NOTES
Subjective   Blanka Carter is a 52 yr old female that presents to the ER with c/o Lt knee pain. Pt claims that she fell during the code in the Cath Lab this afternoon.  Patient landed directly on her left knee.  Patient has an abrasion to her anterior left knee.  Patient is able to bear weight however she complains of pain with movement.  She denies any other injuries.      History provided by:  Patient   used: No    Knee Pain  Location:  Knee  Injury: yes    Mechanism of injury: fall    Fall:     Impact surface:  Hard floor    Point of impact:  Knees  Knee location:  L knee  Pain details:     Quality:  Throbbing    Severity:  Mild    Progression:  Unchanged  Relieved by:  Rest  Worsened by:  Bearing weight and activity  Associated symptoms: no back pain and no neck pain        Review of Systems   Musculoskeletal: Negative for back pain and neck pain.        Lt knee pain   Skin:        Abrasion to knee    Neurological: Negative for weakness.       Past Medical History:   Diagnosis Date   • Cervical spondylosis    • Herpes zoster without complication 2019   • HSV infection    • Hypertension 2020   • Low back pain     Im not sure of the date   • Peripheral neuropathy     Right hand fingers get tingling and numb with excessive use   • Seizure disorder (HCC)     resolved       No Known Allergies    Past Surgical History:   Procedure Laterality Date   •  SECTION     • HYSTERECTOMY      LSH   • SUBTOTAL HYSTERECTOMY     • TUBAL ABDOMINAL LIGATION         Family History   Problem Relation Age of Onset   • Diabetes Father         type 2   • Heart disease Father    • Osteoarthritis Mother    • Arthritis Mother    • Other Mother         Osteoporosis   • Breast cancer Neg Hx    • Ovarian cancer Neg Hx        Social History     Socioeconomic History   • Marital status:    Tobacco Use   • Smoking status: Former Smoker     Packs/day: 0.10     Years: 14.00     Pack  years: 1.40     Types: Cigarettes     Start date:      Quit date:      Years since quittin.6   • Smokeless tobacco: Never Used   • Tobacco comment: 2 cigarettes per day   Vaping Use   • Vaping Use: Never used   Substance and Sexual Activity   • Alcohol use: Yes     Alcohol/week: 4.0 standard drinks     Types: 4 Standard drinks or equivalent per week     Comment: Monthly   • Drug use: No   • Sexual activity: Yes     Partners: Male     Birth control/protection: Surgical           Objective   Physical Exam  Vitals and nursing note reviewed.   Constitutional:       General: She is not in acute distress.     Appearance: Normal appearance. She is not ill-appearing.   HENT:      Head: Normocephalic and atraumatic.      Nose: Nose normal.      Mouth/Throat:      Mouth: Mucous membranes are moist.   Eyes:      Extraocular Movements: Extraocular movements intact.      Pupils: Pupils are equal, round, and reactive to light.   Cardiovascular:      Rate and Rhythm: Normal rate.      Pulses: Normal pulses.   Pulmonary:      Effort: Pulmonary effort is normal. No respiratory distress.   Musculoskeletal:      Cervical back: Normal range of motion.      Left knee: Normal range of motion. Tenderness (anterior knee, abrasion to knee) present. Normal pulse.   Skin:     General: Skin is warm and dry.   Neurological:      Mental Status: She is alert and oriented to person, place, and time.   Psychiatric:         Behavior: Behavior normal.         Procedures           ED Course  ED Course as of 22   Sat Aug 13, 2022   2111 Results are discussed with patient at this time.  Patient to rest, ice, compression elevate extremity.  Patient to alternate Tylenol and ibuprofen.  Patient agrees with treatment plan and verbalized understanding. [KG]      ED Course User Index  [KG] Carlota Barker, APRN           No results found for this or any previous visit (from the past 24 hour(s)).  Note: In addition to lab results from  "this visit, the labs listed above may include labs taken at another facility or during a different encounter within the last 24 hours. Please correlate lab times with ED admission and discharge times for further clarification of the services performed during this visit.    XR Knee 1 or 2 View Left   Final Result      No acute focal bony abnormality seen.             Electronically signed by:  Essence Solis M.D.     8/13/2022 7:01 PM Mountain Time        Vitals:    08/13/22 1904 08/13/22 2135   BP: (!) 167/101    Patient Position: Sitting    Pulse: 85 81   Resp: 18    Temp: 98.2 °F (36.8 °C)    TempSrc: Oral    SpO2: 98% 100%   Weight: 109 kg (240 lb)    Height: 162.6 cm (64\")      Medications - No data to display  ECG/EMG Results (last 24 hours)     ** No results found for the last 24 hours. **        No orders to display                                       MDM    Final diagnoses:   Contusion of left knee, initial encounter       ED Disposition  ED Disposition     ED Disposition   Discharge    Condition   Stable    Comment   --             Slava Hair MD  [address] [address]  [phone]               Medication List      No changes were made to your prescriptions during this visit.          Carlota Barker, APRN  08/13/22 2216    "

## 2022-11-08 DIAGNOSIS — G47.30 SLEEP APNEA, UNSPECIFIED TYPE: Primary | ICD-10-CM

## 2022-12-12 ENCOUNTER — OFFICE VISIT (OUTPATIENT)
Dept: OBSTETRICS AND GYNECOLOGY | Facility: CLINIC | Age: 52
End: 2022-12-12

## 2022-12-12 VITALS — BODY MASS INDEX: 41.2 KG/M2 | HEIGHT: 64 IN

## 2022-12-12 DIAGNOSIS — Z53.21 PATIENT LEFT WITHOUT BEING SEEN: Primary | ICD-10-CM

## 2022-12-23 ENCOUNTER — TELEMEDICINE (OUTPATIENT)
Dept: SLEEP MEDICINE | Facility: HOSPITAL | Age: 52
End: 2022-12-23

## 2022-12-23 DIAGNOSIS — G47.19 EXCESSIVE DAYTIME SLEEPINESS: ICD-10-CM

## 2022-12-23 DIAGNOSIS — R29.818 SUSPECTED SLEEP APNEA: Primary | ICD-10-CM

## 2022-12-23 DIAGNOSIS — R06.83 SNORING: ICD-10-CM

## 2022-12-23 PROCEDURE — 99213 OFFICE O/P EST LOW 20 MIN: CPT | Performed by: NURSE PRACTITIONER

## 2022-12-23 NOTE — PROGRESS NOTES
Chief Complaint:   Chief Complaint   Patient presents with   • Sleeping Problem       HPI:    Blanka Carter is a 52 y.o. female here to establish care.  Patient does see Dr. Slava Hair as her PCP.  Recently patient had several runs of atrial tachycardia, talking to cardiology was instructed to please have sleep testing.  She does have a history of snoring, sleep difficulty, heartburn, and nonrestorative sleep.  She does toss and turn frequently throughout the night.    During the week she will sleep anywhere from 10 PM to 5 AM and weekends 11 PM to 8 AM.  But mostly she gets this usually 4 to 5 hours and she has not rested upon awakening.  Will usually take her 45 minutes to 1 hour to get to sleep.  Patient has an Holly score of 10/24.    We did discuss today the consequences of untreated sleep apnea such as hypertension, atrial fibrillation, stroke, early on stage dementia and sudden cardiac death.  We also discussed different therapies available to her should she be positive such as CPAP, MAD, or ENT referral.    Social history  Very pleasant 52-year-old female.  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  She will drink 2 cups of coffee daily and 1 soda a week.  The rest of her intake is water.  She has a non-smoker and denies illicit substance use.  She will occasionally have an alcoholic drink 2-4 times a month.  Past Medical History:   Diagnosis Date   • Cervical spondylosis    • Herpes zoster without complication 4/16/2019   • HSV infection    • Hypertension Nov 2020   • Low back pain 2005    Im not sure of the date   • Peripheral neuropathy 2019    Right hand fingers get tingling and numb with excessive use   • Seizure disorder (HCC) 1990    resolved     Family History   Problem Relation Age of Onset   • Diabetes Father         type 2   • Heart disease Father    • Osteoarthritis Mother    • Arthritis Mother    • Other Mother         Osteoporosis   • Breast cancer Neg Hx    •  Ovarian cancer Neg Hx      Past Surgical History:   Procedure Laterality Date   •  SECTION     • HYSTERECTOMY      LSH   • SUBTOTAL HYSTERECTOMY     • TUBAL ABDOMINAL LIGATION                 Current medications are:   Current Outpatient Medications:   •  estrogens, conjugated, (PREMARIN) 0.625 MG tablet, Take 1 tablet by mouth Daily., Disp: 90 tablet, Rfl: 3  •  Fluticasone Furoate-Vilanterol (Breo Ellipta) 100-25 MCG/INH inhaler, Inhale 1 puff Daily., Disp: 60 each, Rfl: 5  •  metoprolol succinate XL (TOPROL-XL) 50 MG 24 hr tablet, Take 1 tablet by mouth Daily., Disp: 90 tablet, Rfl: 2  •  Pseudoephedrine-Ibuprofen (ADVIL COLD/SINUS PO), Take 1 tablet by mouth At Night As Needed., Disp: , Rfl:   •  valACYclovir (Valtrex) 1000 MG tablet, Take 1 tablet by mouth Daily., Disp: 90 tablet, Rfl: 3.      The patient's relevant past medical, surgical, family and social history were reviewed and updated in Epic as appropriate.       Review of Systems   Constitutional: Positive for fatigue.   HENT: Positive for congestion, mouth sores and sinus pressure.    Cardiovascular: Positive for palpitations and leg swelling.   Genitourinary: Positive for frequency.   Musculoskeletal: Positive for back pain and myalgias.   Allergic/Immunologic: Positive for environmental allergies.   Neurological: Positive for seizures and headaches.   Psychiatric/Behavioral: Positive for sleep disturbance.         Objective:    Physical Exam  Constitutional:       Appearance: Normal appearance.   HENT:      Head: Normocephalic and atraumatic.   Pulmonary:      Effort: Pulmonary effort is normal. No respiratory distress.   Neurological:      Mental Status: She is alert and oriented to person, place, and time.   Psychiatric:         Mood and Affect: Mood normal.         Behavior: Behavior normal.         Thought Content: Thought content normal.         Judgment: Judgment normal.             ASSESSMENT/PLAN    Diagnoses and all orders for  this visit:    1. Suspected sleep apnea (Primary)  -     Home Sleep Study; Future    2. Snoring  -     Home Sleep Study; Future    3. Excessive daytime sleepiness  -     Home Sleep Study; Future        1. Counseled patient regarding multimodal approach with healthy nutrition, healthy sleep, regular physical activity, social activities, counseling, and medications. Encouraged to practice lateral sleep position. Avoid alcohol and sedatives close to bedtime.  2. Patient does have a very strong story for sleep apnea we will now move forward with HST and follow-up as appropriate.  Patient gave consent for video visit.      I have reviewed the results of my evaluation and impression and discussed my recommendations in detail with the patient.      Signed by  Mckenzie Echeverria, ADRIEN    December 23, 2022      CC: Slava Hair MD [place], Timothy A, *

## 2023-01-16 RX ORDER — METOPROLOL SUCCINATE 50 MG/1
50 TABLET, EXTENDED RELEASE ORAL DAILY
Qty: 90 TABLET | Refills: 2 | Status: SHIPPED | OUTPATIENT
Start: 2023-01-16

## 2023-01-17 RX ORDER — VALACYCLOVIR HYDROCHLORIDE 1 G/1
1000 TABLET, FILM COATED ORAL DAILY
Qty: 90 TABLET | Refills: 3 | Status: SHIPPED | OUTPATIENT
Start: 2023-01-17

## 2023-01-25 ENCOUNTER — OFFICE VISIT (OUTPATIENT)
Dept: INTERNAL MEDICINE | Facility: CLINIC | Age: 53
End: 2023-01-25
Payer: COMMERCIAL

## 2023-01-25 VITALS
TEMPERATURE: 98.4 F | WEIGHT: 240.6 LBS | SYSTOLIC BLOOD PRESSURE: 116 MMHG | HEIGHT: 64 IN | HEART RATE: 72 BPM | DIASTOLIC BLOOD PRESSURE: 78 MMHG | BODY MASS INDEX: 41.07 KG/M2

## 2023-01-25 DIAGNOSIS — E66.01 MORBID (SEVERE) OBESITY DUE TO EXCESS CALORIES: ICD-10-CM

## 2023-01-25 DIAGNOSIS — E78.2 MIXED HYPERLIPIDEMIA: ICD-10-CM

## 2023-01-25 DIAGNOSIS — Z00.00 ANNUAL PHYSICAL EXAM: Primary | ICD-10-CM

## 2023-01-25 DIAGNOSIS — I10 ESSENTIAL HYPERTENSION: ICD-10-CM

## 2023-01-25 PROCEDURE — 99396 PREV VISIT EST AGE 40-64: CPT | Performed by: INTERNAL MEDICINE

## 2023-01-25 RX ORDER — SEMAGLUTIDE 0.25 MG/.5ML
0.25 INJECTION, SOLUTION SUBCUTANEOUS WEEKLY
Qty: 2 ML | Refills: 2 | Status: SHIPPED | OUTPATIENT
Start: 2023-01-25 | End: 2023-04-05

## 2023-01-25 NOTE — PROGRESS NOTES
[facility]     Blanka Carter  1970   [id number]      Patient Care Team:  Slava Hair MD as PCP - General  Slava Hair MD as PCP - Family Medicine    Chief Complaint::   Chief Complaint   Patient presents with   • Annual Exam        HPI    The patient presents today for an annual examination and for follow-up of hypertension.    Hypertension  The patient states she is doing well overall. She states she is tired. She denies any shortness of breath or chest pain. She states her mood is okay. She states she had a rough 01/2023 in the hospital. She states it has been tough, but other than that, it is fine. She states she is taking metoprolol. She states she sees cardiology for her blood pressure. She states she is not getting any exercise. She states she eats sporadically. She denies any problems with shortness of breath, pains in her chest, or knees.    Raynaud's disease  She states she has Raynaud's disease. She states if she goes from cold or warm temperatures, she will get Raynaud's. She states her hands look mottled sometimes. She states she can barely move it.    Health maintenance  She states she is up to date on gynecology care, colonoscopy, and vaccinations.    Chronic Conditions:  Hypertension    Patient Active Problem List   Diagnosis   • Disc disorder of cervical region   • Obesity (BMI 35.0-39.9 without comorbidity)   • Menopause   • Allergic rhinitis   • Ear pain, right   • Annual physical exam   • Bilateral hip pain   • Current moderate episode of major depressive disorder without prior episode (HCC)   • Menopausal symptoms   • Counseling for hormone replacement therapy   • Essential hypertension   • History of hysterectomy, supracervical   • Women's annual routine gynecological examination        Past Medical History:   Diagnosis Date   • Cervical spondylosis    • Herpes zoster without complication 4/16/2019   • HSV infection    • Hypertension Nov 2020   • Low  back pain     Im not sure of the date   • Peripheral neuropathy 2019    Right hand fingers get tingling and numb with excessive use   • Seizure disorder (HCC)     resolved       Past Surgical History:   Procedure Laterality Date   •  SECTION     • HYSTERECTOMY      LSH   • SUBTOTAL HYSTERECTOMY     • TUBAL ABDOMINAL LIGATION         Family History   Problem Relation Age of Onset   • Diabetes Father         type 2   • Heart disease Father    • Osteoarthritis Mother    • Arthritis Mother    • Other Mother         Osteoporosis   • Breast cancer Neg Hx    • Ovarian cancer Neg Hx        Social History     Socioeconomic History   • Marital status:    Tobacco Use   • Smoking status: Former     Packs/day: 0.10     Years: 14.00     Pack years: 1.40     Types: Cigarettes     Start date:      Quit date:      Years since quittin.0   • Smokeless tobacco: Never   • Tobacco comments:     2 cigarettes per day   Vaping Use   • Vaping Use: Never used   Substance and Sexual Activity   • Alcohol use: Yes     Alcohol/week: 4.0 standard drinks     Types: 4 Standard drinks or equivalent per week     Comment: Monthly   • Drug use: No   • Sexual activity: Yes     Partners: Male     Birth control/protection: Surgical       No Known Allergies      Current Outpatient Medications:   •  estrogens, conjugated, (PREMARIN) 0.625 MG tablet, Take 1 tablet by mouth Daily., Disp: 90 tablet, Rfl: 3  •  metoprolol succinate XL (TOPROL-XL) 50 MG 24 hr tablet, Take 1 tablet by mouth Daily., Disp: 90 tablet, Rfl: 2  •  Pseudoephedrine-Ibuprofen (ADVIL COLD/SINUS PO), Take 1 tablet by mouth At Night As Needed., Disp: , Rfl:   •  valACYclovir (Valtrex) 1000 MG tablet, Take 1 tablet by mouth Daily., Disp: 90 tablet, Rfl: 3  •  Semaglutide-Weight Management (Wegovy) 0.25 MG/0.5ML solution auto-injector, Inject 0.25 mg under the skin into the appropriate area as directed 1 (One) Time Per Week., Disp: 1 mL, Rfl:  "2    Immunization History   Administered Date(s) Administered   • COVID-19 (PFIZER) PURPLE CAP 12/21/2020, 01/11/2021, 10/06/2021   • Flu Vaccine Quad PF >36MO 10/24/2017   • Fluzone Quad >6mos (Multi-dose) 10/14/2015, 10/01/2016, 11/02/2020   • Hepatitis A 11/08/2018, 06/25/2019   • Influenza, Unspecified 10/18/2018, 11/15/2021, 10/13/2022   • Shingrix 12/18/2021, 03/31/2022   • Tdap 07/17/2013        Health Maintenance Due   Topic Date Due   • COVID-19 Vaccine (4 - Booster for Pfizer series) 12/01/2021   • ANNUAL PHYSICAL  10/27/2022        Review of Systems   Review of systems is otherwise unremarkable.    Vital Signs  Vitals:    01/25/23 1526   BP: 116/78   BP Location: Right arm   Patient Position: Sitting   Cuff Size: Large Adult   Pulse: 72   Temp: 98.4 °F (36.9 °C)   Weight: 109 kg (240 lb 9.6 oz)   Height: 163 cm (64.17\")   PainSc: 0-No pain       Physical Exam  Vitals and nursing note reviewed.   Constitutional:       Appearance: She is well-developed.   HENT:      Head: Normocephalic and atraumatic.      Right Ear: External ear normal.      Left Ear: External ear normal.      Nose: Nose normal.      Mouth/Throat:      Pharynx: No oropharyngeal exudate.   Eyes:      Conjunctiva/sclera: Conjunctivae normal.      Pupils: Pupils are equal, round, and reactive to light.   Neck:      Thyroid: No thyromegaly.      Vascular: No JVD.   Cardiovascular:      Rate and Rhythm: Normal rate and regular rhythm.      Heart sounds: Normal heart sounds. No murmur heard.    No friction rub. No gallop.   Pulmonary:      Effort: Pulmonary effort is normal. No respiratory distress.      Breath sounds: Normal breath sounds. No wheezing or rales.   Chest:      Chest wall: No tenderness.   Abdominal:      General: Bowel sounds are normal. There is no distension.      Palpations: Abdomen is soft. There is no mass.      Tenderness: There is no abdominal tenderness. There is no guarding or rebound.      Hernia: No hernia is present. "   Musculoskeletal:         General: No tenderness. Normal range of motion.      Cervical back: Normal range of motion and neck supple.   Lymphadenopathy:      Cervical: No cervical adenopathy.   Skin:     General: Skin is warm and dry.      Findings: No erythema or rash.   Neurological:      Mental Status: She is alert and oriented to person, place, and time.      Cranial Nerves: No cranial nerve deficit.      Sensory: No sensory deficit.      Motor: No abnormal muscle tone.      Coordination: Coordination normal.      Deep Tendon Reflexes: Reflexes normal.   Psychiatric:         Behavior: Behavior normal.         Thought Content: Thought content normal.         Judgment: Judgment normal.        Physical examination: she is obese. Examination is otherwise normal.    Procedures     Fall Risk Screen:  [facility] not [facility] completed.    Health Habits and Functional and Cognitive Screening:  No flowsheet data found.    Depression Sreening  PHQ-9 Total Score: 0     ACE III MINI             Assessment/Plan:      1. Prevention  - Overall, she remains in good health. She needs to improve her exercise habits. She is up to date on gynecology care, mammography, and colonoscopy. She is fully vaccinated against influenza and COVID-19.    2. Hypertension  - Blood pressure is well controlled on metoprolol.    3. Hyperlipidemia  - Lipid panel is pending. She will continue efforts at healthy diet.    4. Morbid obesity  - BMI is 41.1 kg/m². She has struggled with her weight most of her adult life. Per her request, we will send a prescription for semaglutide, although I told her there is a significant chance that it will not be covered by insurance and it is very expensive. I also recommended that she resume exercise, particularly strength training and told her that improving muscle strength would be extremely beneficial to her even in the absence of weight loss.    Follow up in 1 year.    Class 3 Severe Obesity  (BMI >=40). Obesity-related health conditions include the following: hypertension and dyslipidemias. Obesity is unchanged. BMI is is above average; BMI management plan is completed. We discussed portion control, increasing exercise and joining a fitness center or start home based exercise program.      Diagnoses and all orders for this visit:    1. Annual physical exam (Primary)    2. Essential hypertension    3. Mixed hyperlipidemia  -     Comprehensive Metabolic Panel; Future  -     Lipid Panel; Future  -     Apolipoprotein B; Future    4. Morbid (severe) obesity due to excess calories (HCC)    Other orders  -     Semaglutide-Weight Management (Wegovy) 0.25 MG/0.5ML solution auto-injector; Inject 0.25 mg under the skin into the appropriate area as directed 1 (One) Time Per Week.  Dispense: 1 mL; Refill: 2            Labs  Results for orders placed or performed in visit on 10/28/21   HCV RNA By PCR, Qn Rfx Faina    Specimen: Blood   Result Value Ref Range    Hepatitis C Quantitation HCV Not Detected IU/mL    HCV log10      HCV Test Information Comment     HCV Genotype          Counseling was given to patient for the following topics: appropriate exercise 150 minutes per week, disease prevention and healthy eating habits.    Plan of care reviewed with patient at the conclusion of today's visit. Education was provided regarding diagnosis, management, and any prescribed or recommended OTC medications.Patient verbalizes understanding of and agreement with management plan.         Slava Hair MD       Transcribed from ambient dictation for Slava Hair MD by Milagros Simpson.  01/25/23   17:01 EST    Patient or patient representative verbalized consent to the visit recording.  I have personally performed the services described in this document as transcribed by the above individual, and it is both accurate and complete.

## 2023-01-27 ENCOUNTER — LAB (OUTPATIENT)
Dept: LAB | Facility: HOSPITAL | Age: 53
End: 2023-01-27
Payer: COMMERCIAL

## 2023-01-27 DIAGNOSIS — E78.2 MIXED HYPERLIPIDEMIA: ICD-10-CM

## 2023-01-27 LAB
ALBUMIN SERPL-MCNC: 4.2 G/DL (ref 3.5–5.2)
ALBUMIN/GLOB SERPL: 1.3 G/DL
ALP SERPL-CCNC: 80 U/L (ref 39–117)
ALT SERPL W P-5'-P-CCNC: 17 U/L (ref 1–33)
ANION GAP SERPL CALCULATED.3IONS-SCNC: 10.9 MMOL/L (ref 5–15)
AST SERPL-CCNC: 17 U/L (ref 1–32)
BILIRUB SERPL-MCNC: <0.2 MG/DL (ref 0–1.2)
BUN SERPL-MCNC: 9 MG/DL (ref 6–20)
BUN/CREAT SERPL: 11.4 (ref 7–25)
CALCIUM SPEC-SCNC: 9.3 MG/DL (ref 8.6–10.5)
CHLORIDE SERPL-SCNC: 104 MMOL/L (ref 98–107)
CHOLEST SERPL-MCNC: 190 MG/DL (ref 0–200)
CO2 SERPL-SCNC: 26.1 MMOL/L (ref 22–29)
CREAT SERPL-MCNC: 0.79 MG/DL (ref 0.57–1)
EGFRCR SERPLBLD CKD-EPI 2021: 89 ML/MIN/1.73
GLOBULIN UR ELPH-MCNC: 3.2 GM/DL
GLUCOSE SERPL-MCNC: 87 MG/DL (ref 65–99)
HDLC SERPL-MCNC: 53 MG/DL (ref 40–60)
LDLC SERPL CALC-MCNC: 108 MG/DL (ref 0–100)
LDLC/HDLC SERPL: 1.95 {RATIO}
POTASSIUM SERPL-SCNC: 4.2 MMOL/L (ref 3.5–5.2)
PROT SERPL-MCNC: 7.4 G/DL (ref 6–8.5)
SODIUM SERPL-SCNC: 141 MMOL/L (ref 136–145)
TRIGL SERPL-MCNC: 167 MG/DL (ref 0–150)
VLDLC SERPL-MCNC: 29 MG/DL (ref 5–40)

## 2023-01-27 PROCEDURE — 36415 COLL VENOUS BLD VENIPUNCTURE: CPT

## 2023-01-27 PROCEDURE — 80053 COMPREHEN METABOLIC PANEL: CPT

## 2023-01-27 PROCEDURE — 80061 LIPID PANEL: CPT

## 2023-01-27 PROCEDURE — 82172 ASSAY OF APOLIPOPROTEIN: CPT

## 2023-01-30 ENCOUNTER — TELEPHONE (OUTPATIENT)
Dept: INTERNAL MEDICINE | Facility: CLINIC | Age: 53
End: 2023-01-30
Payer: COMMERCIAL

## 2023-01-31 LAB — APO B SERPL-MCNC: 93 MG/DL

## 2023-02-07 DIAGNOSIS — R29.818 SUSPECTED SLEEP APNEA: Primary | ICD-10-CM

## 2023-02-07 DIAGNOSIS — G47.19 EXCESSIVE DAYTIME SLEEPINESS: ICD-10-CM

## 2023-04-05 ENCOUNTER — PRIOR AUTHORIZATION (OUTPATIENT)
Dept: INTERNAL MEDICINE | Facility: CLINIC | Age: 53
End: 2023-04-05
Payer: COMMERCIAL

## 2023-04-05 RX ORDER — SEMAGLUTIDE 0.5 MG/.5ML
0.5 INJECTION, SOLUTION SUBCUTANEOUS WEEKLY
Qty: 2 ML | Refills: 2 | Status: SHIPPED | OUTPATIENT
Start: 2023-04-05

## 2023-04-05 NOTE — TELEPHONE ENCOUNTER
Rec'd PA request for Wegovy. This was done and approved 1/30/23 - 1/30/24. Called [facility] [phone] and spoke to Jono - they do have PA but it is being rejected. Called [facility] and spoke to Priscilla. Rejection is due to a quantity limit - only allows 4 boxes every 180 days. Will need to either submit quantity limit increase or increase dose. Please advise

## 2023-04-25 ENCOUNTER — HOSPITAL ENCOUNTER (OUTPATIENT)
Dept: SLEEP MEDICINE | Facility: HOSPITAL | Age: 53
End: 2023-04-25
Payer: COMMERCIAL

## 2023-04-25 VITALS — BODY MASS INDEX: 41.03 KG/M2 | WEIGHT: 240.3 LBS | HEIGHT: 64 IN

## 2023-04-25 DIAGNOSIS — R06.83 SNORING: ICD-10-CM

## 2023-04-25 DIAGNOSIS — G47.19 EXCESSIVE DAYTIME SLEEPINESS: ICD-10-CM

## 2023-04-25 DIAGNOSIS — R29.818 SUSPECTED SLEEP APNEA: Primary | ICD-10-CM

## 2023-04-25 DIAGNOSIS — R29.818 SUSPECTED SLEEP APNEA: ICD-10-CM

## 2023-04-25 PROCEDURE — 95806 SLEEP STUDY UNATT&RESP EFFT: CPT

## 2023-04-28 DIAGNOSIS — G47.33 OSA (OBSTRUCTIVE SLEEP APNEA): Primary | ICD-10-CM

## 2023-05-02 NOTE — PROGRESS NOTES
CALLED PATIENT AND ADVISED OF STUDY RESULTS. PATIENT VERBALIZED UNDERSTANDING AND WAS AGREEABLE TO PAP THERAPY. FAXED ORDER TO [facility] 05/02/23

## 2023-06-01 ENCOUNTER — PRIOR AUTHORIZATION (OUTPATIENT)
Dept: INTERNAL MEDICINE | Facility: CLINIC | Age: 53
End: 2023-06-01

## 2023-06-02 NOTE — TELEPHONE ENCOUNTER
PA denied as ins states the 0.5 mg dose should not be used as a maintenance dose. States she should titrate up to 1 mg, then 1.7 mg and finally 2.4 mg.  LM for pt to return call

## 2023-06-05 ENCOUNTER — OFFICE VISIT (OUTPATIENT)
Dept: OBSTETRICS AND GYNECOLOGY | Facility: CLINIC | Age: 53
End: 2023-06-05
Payer: COMMERCIAL

## 2023-06-05 ENCOUNTER — TELEPHONE (OUTPATIENT)
Dept: INTERNAL MEDICINE | Facility: CLINIC | Age: 53
End: 2023-06-05
Payer: COMMERCIAL

## 2023-06-05 VITALS
BODY MASS INDEX: 38.93 KG/M2 | DIASTOLIC BLOOD PRESSURE: 68 MMHG | HEIGHT: 64 IN | WEIGHT: 228 LBS | SYSTOLIC BLOOD PRESSURE: 120 MMHG

## 2023-06-05 DIAGNOSIS — Z12.39 BREAST SCREENING: ICD-10-CM

## 2023-06-05 DIAGNOSIS — Z71.89 COUNSELING FOR HORMONE REPLACEMENT THERAPY: ICD-10-CM

## 2023-06-05 DIAGNOSIS — Z01.419 WOMEN'S ANNUAL ROUTINE GYNECOLOGICAL EXAMINATION: Primary | ICD-10-CM

## 2023-06-05 PROBLEM — E89.40 SURGICAL MENOPAUSE: Status: ACTIVE | Noted: 2023-06-05

## 2023-06-05 PROCEDURE — 99396 PREV VISIT EST AGE 40-64: CPT | Performed by: OBSTETRICS & GYNECOLOGY

## 2023-06-05 RX ORDER — SEMAGLUTIDE 1 MG/.5ML
1 INJECTION, SOLUTION SUBCUTANEOUS WEEKLY
Qty: 2 ML | Refills: 1 | Status: SHIPPED | OUTPATIENT
Start: 2023-06-05

## 2023-06-05 NOTE — TELEPHONE ENCOUNTER
Notified pt PA denied for Wegovy 0.5 mg. Ins claims 0.5 mg is a titration dose and this should have been completed by 4/26. They rec titrating up to 1 mg dose.  Pt wants to increase to 1 mg

## 2023-06-05 NOTE — PROGRESS NOTES
Subjective     Chief Complaint   Patient presents with    Gynecologic Exam     Annual        Blankalaw Carter is a 53 y.o. year old  presenting to be seen for her annual exam.      She is sexually active.  In the past 12 months there have not been new sexual partners.  Condoms are not typically used.  She would not like to be screened for STD's at today's exam.     She exercises regularly: yes.  She wears her seat belt: yes.  She has concerns about domestic violence: no.  She has noticed changes in height: no  Health Maintenance for Postmenopausal Women  Menopause is a normal process in which your ability to get pregnant comes to an end. This process happens slowly over many months or years, usually between the ages of 48 and 55. Menopause is complete when you have missed your menstrual period for 12 months.  It is important to talk with your health care provider about some of the most common conditions that affect women after menopause (postmenopausal women). These include heart disease, cancer, and bone loss (osteoporosis). Adopting a healthy lifestyle and getting preventive care can help to promote your health and wellness. The actions you take can also lower your chances of developing some of these common conditions.  What are the signs and symptoms of menopause?  During menopause, you may have the following symptoms:  Hot flashes. These can be moderate or severe.  Night sweats.  Decrease in sex drive.  Mood swings.  Headaches.  Tiredness (fatigue).  Irritability.  Memory problems.  Problems falling asleep or staying asleep.  Talk with your health care provider about treatment options for your symptoms.  Do I need hormone replacement therapy?  Hormone replacement therapy is effective in treating symptoms that are caused by menopause, such as hot flashes and night sweats.  Hormone replacement carries certain risks, especially as you become older. If you are thinking about using estrogen or estrogen with  progestin, discuss the benefits and risks with your health care provider.  How can I reduce my risk for heart disease and stroke?  The risk of heart disease, heart attack, and stroke increases as you age. One of the causes may be a change in the body's hormones during menopause. This can affect how your body uses dietary fats, triglycerides, and cholesterol. Heart attack and stroke are medical emergencies. There are many things that you can do to help prevent heart disease and stroke.  Watch your blood pressure  High blood pressure causes heart disease and increases the risk of stroke. This is more likely to develop in people who have high blood pressure readings or are overweight.  Have your blood pressure checked:  Every 3-5 years if you are 18-39 years of age.  Every year if you are 40 years old or older.  Eat a healthy diet    Eat a diet that includes plenty of vegetables, fruits, low-fat dairy products, and lean protein.  Do not eat a lot of foods that are high in solid fats, added sugars, or sodium.  Get regular exercise  Get regular exercise. This is one of the most important things you can do for your health. Most adults should:  Try to exercise for at least 150 minutes each week. The exercise should increase your heart rate and make you sweat (moderate-intensity exercise).  Try to do strengthening exercises at least twice each week. Do these in addition to the moderate-intensity exercise.  Spend less time sitting. Even light physical activity can be beneficial.  Other tips  Work with your health care provider to achieve or maintain a healthy weight.  Do not use any products that contain nicotine or tobacco. These products include cigarettes, chewing tobacco, and vaping devices, such as e-cigarettes. If you need help quitting, ask your health care provider.  Know your numbers. Ask your health care provider to check your cholesterol and your blood sugar (glucose). Continue to have your blood tested as  directed by your health care provider.  Do I need screening for cancer?  Depending on your health history and family history, you may need to have cancer screenings at different stages of your life. This may include screening for:  Breast cancer.  Cervical cancer.  Lung cancer.  Colorectal cancer.  What is my risk for osteoporosis?  After menopause, you may be at increased risk for osteoporosis. Osteoporosis is a condition in which bone destruction happens more quickly than new bone creation. To help prevent osteoporosis or the bone fractures that can happen because of osteoporosis, you may take the following actions:  If you are 19-50 years old, get at least 1,000 mg of calcium and at least 600 international units (IU) of vitamin D per day.  If you are older than age 50 but younger than age 70, get at least 1,200 mg of calcium and at least 600 international units (IU) of vitamin D per day.  If you are older than age 70, get at least 1,200 mg of calcium and at least 800 international units (IU) of vitamin D per day.  Smoking and drinking excessive alcohol increase the risk of osteoporosis. Eat foods that are rich in calcium and vitamin D, and do weight-bearing exercises several times each week as directed by your health care provider.  How does menopause affect my mental health?  Depression may occur at any age, but it is more common as you become older. Common symptoms of depression include:  Feeling depressed.  Changes in sleep patterns.  Changes in appetite or eating patterns.  Feeling an overall lack of motivation or enjoyment of activities that you previously enjoyed.  Frequent crying spells.  Talk with your health care provider if you think that you are experiencing any of these symptoms.  General instructions  See your health care provider for regular wellness exams and vaccines. This may include:  Scheduling regular health, dental, and eye exams.  Getting and maintaining your vaccines. These  "include:  Influenza vaccine. Get this vaccine each year before the flu season begins.  Pneumonia vaccine.  Shingles vaccine.  Tetanus, diphtheria, and pertussis (Tdap) booster vaccine.  Your health care provider may also recommend other immunizations.  Tell your health care provider if you have ever been abused or do not feel safe at home.  Summary  Menopause is a normal process in which your ability to get pregnant comes to an end.  This condition causes hot flashes, night sweats, decreased interest in sex, mood swings, headaches, or lack of sleep.  Treatment for this condition may include hormone replacement therapy.  Take actions to keep yourself healthy, including exercising regularly, eating a healthy diet, watching your weight, and checking your blood pressure and blood sugar levels.  Get screened for cancer and depression. Make sure that you are up to date with all your vaccines.  GYN screening history:  Last pap: she reports her last PAP was normal  Last mammogram: she reports her last mammogram was normal  Last fasting lipid profile: borderline LDL  Last colonoscopy: she reports her last colonoscopy was normal  Last DEXA: she reports her last DEXA was normal.    No Additional Complaints Reported    The following portions of the patient's history were reviewed and updated as appropriate:vital signs, allergies, current medications, past medical history, past social history, past surgical history, and problem list.    Review of Systems  Pertinent items are noted in HPI.     Physical Exam    Objective     /68   Ht 163 cm (64.17\")   Wt 103 kg (228 lb)   LMP 12/01/2013 Comment: hysterectomy  Breastfeeding No   BMI 38.93 kg/m²     General:  well developed; well nourished  no acute distress   Constitutional: obese and healthy   Skin:  No suspicious lesions seen   Thyroid: normal to inspection and palpation   Lungs:  breathing is unlabored  clear to auscultation bilaterally   Heart:  regular rate and " rhythm, S1, S2 normal, no murmur, click, rub or gallop   Breasts:  Examined in supine position  Symmetric without masses or skin dimpling  Nipples normal without inversion, lesions or discharge  There are no palpable axillary nodes   Abdomen: soft, non-tender; no masses  no umbilical or inginual hernias are present  no hepato-splenomegaly   Pelvis: Clinical staff was present for exam  External genitalia:  normal appearance of the external genitalia including Bartholin's and La Barge's glands.  :  urethral meatus normal; urethral hypermobility is absent.  Vaginal:  normal pink mucosa without prolapse or lesions.  Cervix:  normal appearance pap performed  Uterus:  absent  Adnexa:  normal bimanual exam of the adnexa.   Musculoskeletal: negative   Neuro: normal without focal findings, mental status, speech normal, alert and oriented x3, and LOBO   Psych: oriented to time, place and person, mood and affect are within normal limits, pt is a good historian; no memory problems were noted       Lab Review   PAP and FLP    Imaging  Mammogram report  colonoscopy    Assessment & Plan     ASSESSMENT  1. Women's annual routine gynecological examination Desires to continue ERT. Risk benefits reviewed.   2. Breast screening        PLAN  Orders Placed This Encounter   Procedures    Mammo Screening Digital Tomosynthesis Bilateral With CAD     Standing Status:   Future     Standing Expiration Date:   6/4/2024     Order Specific Question:   Reason for Exam:     Answer:   screen     New Medications Ordered This Visit   Medications    estrogens, conjugated, (PREMARIN) 0.625 MG tablet     Sig: Take 1 tablet by mouth Daily.     Dispense:  90 tablet     Refill:  3         Follow up: 1 year(s)         This note was electronically signed.    Jack Rodney MD  June 5, 2023

## 2023-06-07 LAB — REF LAB TEST METHOD: NORMAL

## 2023-07-25 DIAGNOSIS — R29.818 SUSPECTED SLEEP APNEA: Primary | ICD-10-CM

## 2023-07-25 DIAGNOSIS — R06.83 SNORING: ICD-10-CM

## 2023-08-15 ENCOUNTER — OFFICE VISIT (OUTPATIENT)
Dept: SLEEP MEDICINE | Facility: HOSPITAL | Age: 53
End: 2023-08-15
Payer: COMMERCIAL

## 2023-08-15 VITALS
HEART RATE: 65 BPM | DIASTOLIC BLOOD PRESSURE: 83 MMHG | BODY MASS INDEX: 38.55 KG/M2 | SYSTOLIC BLOOD PRESSURE: 143 MMHG | HEIGHT: 64 IN | WEIGHT: 225.8 LBS | OXYGEN SATURATION: 96 %

## 2023-08-15 DIAGNOSIS — G47.33 OSA (OBSTRUCTIVE SLEEP APNEA): Primary | ICD-10-CM

## 2023-08-15 NOTE — PROGRESS NOTES
"Chief Complaint:   Chief Complaint   Patient presents with    Follow-up       HPI:    Blanka Carter is a 53 y.o. female here for follow-up of sleep apnea.  Patient was last seen 12/23/2022 in consult for atrial tachycardia, snoring, and nonrestorative sleep.  Patient had a sleep study 4/26/2023 that showed mild obstructive sleep apnea and did initiate CPAP therapy.  Patient states she is \"60% better.\"  There are some nights that she sleeps straight through and some nights her face will itch during the night she must take off her mask.  She is going to work with DME for possible different antiallergen mask.  Overall, she is doing well, has no concerns or complaints and will continue therapy.        Current medications are:   Current Outpatient Medications:     estrogens, conjugated, (PREMARIN) 0.625 MG tablet, Take 1 tablet by mouth Daily., Disp: 90 tablet, Rfl: 3    metoprolol succinate XL (TOPROL-XL) 50 MG 24 hr tablet, Take 1 tablet by mouth Daily., Disp: 90 tablet, Rfl: 2    Pseudoephedrine-Ibuprofen (ADVIL COLD/SINUS PO), Take 1 tablet by mouth At Night As Needed., Disp: , Rfl:     Semaglutide-Weight Management (Wegovy) 1 MG/0.5ML solution auto-injector, Inject 0.5 mL under the skin into the appropriate area as directed 1 (One) Time Per Week., Disp: 2 mL, Rfl: 1    valACYclovir (Valtrex) 1000 MG tablet, Take 1 tablet by mouth Daily., Disp: 90 tablet, Rfl: 3.      The patient's relevant past medical, surgical, family and social history were reviewed and updated in Epic as appropriate.       Review of Systems   HENT:  Positive for congestion, mouth sores and sinus pressure.    Respiratory:  Positive for apnea.    Cardiovascular:  Positive for palpitations and leg swelling.   Genitourinary:  Positive for frequency.   Musculoskeletal:  Positive for back pain and myalgias.   Allergic/Immunologic: Positive for environmental allergies.   Neurological:  Positive for seizures and headaches.        Last seizure 1989 "   Psychiatric/Behavioral:  Positive for sleep disturbance.        Objective:    Physical Exam  Constitutional:       Appearance: Normal appearance.   HENT:      Head: Normocephalic and atraumatic.   Cardiovascular:      Rate and Rhythm: Normal rate and regular rhythm.   Pulmonary:      Effort: Pulmonary effort is normal.      Breath sounds: Normal breath sounds.   Skin:     General: Skin is warm and dry.   Neurological:      Mental Status: She is alert and oriented to person, place, and time.   Psychiatric:         Mood and Affect: Mood normal.         Behavior: Behavior normal.         Thought Content: Thought content normal.         Judgment: Judgment normal.     CPAP Report  30/30 days of use  Greater than 4-hour use 93%  Settings 8-18  AHI 0.1  95th percentile pressure 11.3    The patient continues to use and benefit from CPAP therapy.    ASSESSMENT/PLAN    Diagnoses and all orders for this visit:    1. KONSTANTIN (obstructive sleep apnea) (Primary)  -     PAP Therapy        Counseled patient regarding multimodal approach with healthy nutrition, healthy sleep, regular physical activity, social activities, counseling, and medications. Encouraged to practice lateral sleep position. Avoid alcohol and sedatives close to bedtime.      Refill supplies x1 year.  Return to clinic 1 year or sooner symptoms warrant.  I have reviewed the results of my evaluation and impression and discussed my recommendations in detail with the patient.      Signed by  Mckenzie Echeverria, ADRIEN    August 15, 2023      CC: Slaav Hair MD         No ref. provider found

## 2023-10-31 DIAGNOSIS — E66.9 OBESITY (BMI 35.0-39.9 WITHOUT COMORBIDITY): Primary | ICD-10-CM

## 2023-10-31 RX ORDER — PHENTERMINE HYDROCHLORIDE 37.5 MG/1
37.5 CAPSULE ORAL EVERY MORNING
Qty: 30 CAPSULE | Refills: 0 | Status: SHIPPED | OUTPATIENT
Start: 2023-10-31

## 2023-12-13 ENCOUNTER — OFFICE VISIT (OUTPATIENT)
Dept: INTERNAL MEDICINE | Facility: CLINIC | Age: 53
End: 2023-12-13
Payer: COMMERCIAL

## 2023-12-13 VITALS
HEIGHT: 64 IN | DIASTOLIC BLOOD PRESSURE: 80 MMHG | OXYGEN SATURATION: 100 % | BODY MASS INDEX: 38.96 KG/M2 | TEMPERATURE: 98 F | SYSTOLIC BLOOD PRESSURE: 142 MMHG | WEIGHT: 228.2 LBS | HEART RATE: 61 BPM

## 2023-12-13 DIAGNOSIS — B35.3 TINEA PEDIS OF BOTH FEET: Chronic | ICD-10-CM

## 2023-12-13 DIAGNOSIS — I10 ESSENTIAL HYPERTENSION: Primary | ICD-10-CM

## 2023-12-13 DIAGNOSIS — M79.671 RIGHT FOOT PAIN: Chronic | ICD-10-CM

## 2023-12-13 PROCEDURE — 99214 OFFICE O/P EST MOD 30 MIN: CPT | Performed by: INTERNAL MEDICINE

## 2023-12-13 RX ORDER — LISINOPRIL 5 MG/1
5 TABLET ORAL DAILY
Qty: 90 TABLET | Refills: 1 | Status: SHIPPED | OUTPATIENT
Start: 2023-12-13

## 2023-12-13 RX ORDER — METOPROLOL SUCCINATE 50 MG/1
50 TABLET, EXTENDED RELEASE ORAL DAILY
Qty: 90 TABLET | Refills: 3 | Status: SHIPPED | OUTPATIENT
Start: 2023-12-13

## 2023-12-13 NOTE — ASSESSMENT & PLAN NOTE
- Her blood pressure has been running in the 140s over 80s.   - She will continue taking the current dose of metoprolol daily at night.   - Add low dose lisinopril 5 mg every morning.   - Continue to avoid salt in the diet.   - Also try to get regular exercise.   - She has actually lost 12 pounds since 04/2023.

## 2023-12-13 NOTE — PROGRESS NOTES
[facility]     Blanka Carter  1970   [id number]      Patient Care Team:  Slava Hair MD as PCP - General  Slava Hair MD as PCP - Family Medicine    Chief Complaint   Patient presents with    Foot Swelling     Right foot, started Sunday. It is painful and has worsened over the past 2 days            HPI    The patient presents today for an acute office visit.    Right foot pain  The patient complains of right foot pain and swelling on the pad of her foot, right below the toe. She put some shoes on Saturday, 12/09/2023, that she usually leaves out in her garage because she uses them to work outside. The patient noticed the pain on Sunday night, 12/10/2023. She did not think anything about it because her toes had been a little moist than normal. The patient has been using Tinactin. On Monday, 12/11/2023, when she got up, it was a little swollen. Yesterday, 12/12/2023, she was pushing on it and it was hard. The patient denies fever or chills. It is on the bottom of her foot. The patient is not using anything else for the yeast between her toes. She does not wear her shoes a lot because they are muck boots. The patient has put on her athletic shoes to see if they feel better. When she first puts them on, it is tender, but as the day progresses, it gets better. She has Voltaren gel at home. The patient will wake up in the middle of the night with itching between her toes. She has not tried any Lamisil cream.    Hypertension  The patient's blood pressure is a little elevated today at 142 mmHg systolic. She checks her blood pressure at home every other day. The patient takes metoprolol 1 time a day at night. Her systolic blood pressure is in the 140s mmHg and her diastolic stays 78 to 80 mmHg. The patient has stayed in the 140s mmHg systolic since she has been on the metoprolol. She has never been on any other medications for blood pressure. The patient is not allergic to  "anything. She will add lisinopril 5 mg.      CHRONIC CONDITIONS      Past Medical History:   Diagnosis Date    Allergic 1975    Seasonal    Cervical spondylosis     Herpes zoster without complication 2019    HSV infection     Hypertension 2020    Kidney stone 2015    Low back pain 2005    Im not sure of the date    Peripheral neuropathy     Right hand fingers get tingling and numb with excessive use    Seizure disorder 1990    resolved    Women's annual routine gynecological examination 2021       Past Surgical History:   Procedure Laterality Date     SECTION      COLONOSCOPY  2019    2019    HYSTERECTOMY      LSH    SUBTOTAL HYSTERECTOMY  2013    TUBAL ABDOMINAL LIGATION         Family History   Problem Relation Age of Onset    Diabetes Father         type 2    Heart disease Father     Osteoarthritis Mother     Arthritis Mother     Other Mother         Osteoporosis    Breast cancer Neg Hx     Ovarian cancer Neg Hx        Social History     Socioeconomic History    Marital status:    Tobacco Use    Smoking status: Former     Packs/day: 0.10     Years: 14.00     Additional pack years: 0.00     Total pack years: 1.40     Types: Cigarettes     Start date: 2003     Quit date: 2014     Years since quittin.9    Smokeless tobacco: Never    Tobacco comments:     2 cigarettes per day   Vaping Use    Vaping Use: Never used   Substance and Sexual Activity    Alcohol use: Yes     Alcohol/week: 4.0 standard drinks of alcohol     Comment: Monthly    Drug use: No    Sexual activity: Yes     Partners: Male     Birth control/protection: Surgical       No Known Allergies    Review of Systems:     Review of Systems    Vital Signs  Vitals:    23 1409   BP: 142/80   BP Location: Left arm   Patient Position: Sitting   Cuff Size: Adult   Pulse: 61   Temp: 98 °F (36.7 °C)   TempSrc: Infrared   SpO2: 100%   Weight: 104 kg (228 lb 3.2 oz)   Height: 162.6 cm (64.02\")   PainSc:   3 "   PainLoc: Foot     Body mass index is 39.15 kg/m².  Class 2 Severe Obesity (BMI >=35 and <=39.9). Obesity-related health conditions include the following: hypertension. Obesity is unchanged. BMI is is above average; BMI management plan is completed. We discussed low calorie, low carb based diet program, portion control, increasing exercise, joining a fitness center or start home based exercise program, and Information on healthy weight added to patient's after visit summary.        Current Outpatient Medications:     estrogens, conjugated, (PREMARIN) 0.625 MG tablet, Take 1 tablet by mouth Daily., Disp: 90 tablet, Rfl: 3    metoprolol succinate XL (TOPROL-XL) 50 MG 24 hr tablet, Take 1 tablet by mouth once daily, Disp: 90 tablet, Rfl: 3    phentermine 37.5 MG capsule, Take 1 capsule by mouth Every Morning., Disp: 30 capsule, Rfl: 0    Pseudoephedrine-Ibuprofen (ADVIL COLD/SINUS PO), Take 1 tablet by mouth At Night As Needed., Disp: , Rfl:     valACYclovir (Valtrex) 1000 MG tablet, Take 1 tablet by mouth Daily., Disp: 90 tablet, Rfl: 3    lisinopril (PRINIVIL,ZESTRIL) 5 MG tablet, Take 1 tablet by mouth Daily., Disp: 90 tablet, Rfl: 1    Physical Exam:    Physical Exam  Vitals and nursing note reviewed.   Constitutional:       Appearance: She is well-developed.      Comments: Obese.   HENT:      Head: Normocephalic.   Eyes:      Conjunctiva/sclera: Conjunctivae normal.      Pupils: Pupils are equal, round, and reactive to light.   Neck:      Thyroid: No thyromegaly.   Cardiovascular:      Rate and Rhythm: Normal rate and regular rhythm.      Heart sounds: Normal heart sounds.   Pulmonary:      Effort: Pulmonary effort is normal.      Breath sounds: Normal breath sounds.   Musculoskeletal:         General: Normal range of motion.      Cervical back: Normal range of motion and neck supple.      Comments: Fungus between toes on both feet. Tenderness at the base of the plantar surface of the 2nd and 3rd toe on the right  "foot. A little swollen. No erythema, no edema or erythema on top of the foot. The toe itself is not swollen or erythemic.   Lymphadenopathy:      Cervical: No cervical adenopathy.   Neurological:      Mental Status: She is alert and oriented to person, place, and time.   Psychiatric:         Thought Content: Thought content normal.          ACE III MINI        Results Review:    I reviewed the patient's new clinical results.    CMP:  Lab Results   Component Value Date    BUN 9 01/27/2023    CREATININE 0.79 01/27/2023    EGFRIFNONA 75 10/27/2021    BCR 11.4 01/27/2023     01/27/2023    K 4.2 01/27/2023    CO2 26.1 01/27/2023    CALCIUM 9.3 01/27/2023    ALBUMIN 4.2 01/27/2023    BILITOT <0.2 01/27/2023    ALKPHOS 80 01/27/2023    AST 17 01/27/2023    ALT 17 01/27/2023     HbA1c:  No results found for: \"HGBA1C\"  Microalbumin:  Lab Results   Component Value Date    MICROALBUR <1.2 10/27/2021     Lipid Panel  Lab Results   Component Value Date    CHOL 190 01/27/2023    TRIG 167 (H) 01/27/2023    HDL 53 01/27/2023     (H) 01/27/2023    AST 17 01/27/2023    ALT 17 01/27/2023       Medication Review: Medications reviewed and noted  Patient Instructions   Problem List Items Addressed This Visit          Cardiac and Vasculature    Essential hypertension - Primary    Current Assessment & Plan     - Her blood pressure has been running in the 140s over 80s.   - She will continue taking the current dose of metoprolol daily at night.   - Add low dose lisinopril 5 mg every morning.   - Continue to avoid salt in the diet.   - Also try to get regular exercise.   - She has actually lost 12 pounds since 04/2023.         Relevant Medications    lisinopril (PRINIVIL,ZESTRIL) 5 MG tablet    metoprolol succinate XL (TOPROL-XL) 50 MG 24 hr tablet       Musculoskeletal and Injuries    Right foot pain (Chronic)    Current Assessment & Plan     - I believe she injured the soft tissue on the bottom of the right foot at the base of " the second and third toes when she wore some Muck boots that she does not wear very often.   - I have advised her to soak the feet 1 time a day for the next few days in half and half warm water and vinegar.   - Make sure she is wearing good supportive athletic shoes.   - Avoid the Muck boots and the UGG boots since they do not have much cushion.   - Put Voltaren gel on the foot up to 4 times per day to decrease inflammation.            Skin    Tinea pedis of both feet (Chronic)    Current Assessment & Plan     - Get some over-the-counter Lamisil cream and put it between all the toes 2 times per day.   - Also use the Tinactin foot powder spray on the feet and in the shoes.   - After bathing, make sure she gets her feet very dry between the toes.                 Diagnosis Plan   1. Essential hypertension        2. Tinea pedis of both feet        3. Right foot pain            She will come back to see Dr. Hair on 03/04/2024, but she was asked to send us her blood pressure readings in 3 or 4 weeks if they are not running more in the 120s over 70s.          Plan of care reviewed with patient at the conclusion of today's visit. Education was provided regarding diagnosis, management, and any prescribed or recommended OTC medications.Patient verbalizes understanding of and agreement with management plan.         Allison Velasco MD    Transcribed from ambient dictation for Allison Velasco MD by Erin Evans.  12/13/23   16:34 EST    Patient or patient representative verbalized consent to the visit recording.  I have personally performed the services described in this document as transcribed by the above individual, and it is both accurate and complete.

## 2023-12-13 NOTE — ASSESSMENT & PLAN NOTE
- Get some over-the-counter Lamisil cream and put it between all the toes 2 times per day.   - Also use the Tinactin foot powder spray on the feet and in the shoes.   - After bathing, make sure she gets her feet very dry between the toes.

## 2023-12-13 NOTE — ASSESSMENT & PLAN NOTE
- I believe she injured the soft tissue on the bottom of the right foot at the base of the second and third toes when she wore some Muck boots that she does not wear very often.   - I have advised her to soak the feet 1 time a day for the next few days in half and half warm water and vinegar.   - Make sure she is wearing good supportive athletic shoes.   - Avoid the Muck boots and the UGG boots since they do not have much cushion.   - Put Voltaren gel on the foot up to 4 times per day to decrease inflammation.

## 2023-12-15 NOTE — PATIENT INSTRUCTIONS
Patient Instructions  Problem List Items Addressed This Visit          Cardiac and Vasculature    Essential hypertension - Primary    Current Assessment & Plan     - Her blood pressure has been running in the 140s over 80s.   - She will continue taking the current dose of metoprolol daily at night.   - Add low dose lisinopril 5 mg every morning.   - Continue to avoid salt in the diet.   - Also try to get regular exercise.   - She has actually lost 12 pounds since 04/2023.         Relevant Medications    lisinopril (PRINIVIL,ZESTRIL) 5 MG tablet    metoprolol succinate XL (TOPROL-XL) 50 MG 24 hr tablet       Musculoskeletal and Injuries    Right foot pain (Chronic)    Current Assessment & Plan     - I believe she injured the soft tissue on the bottom of the right foot at the base of the second and third toes when she wore some Muck boots that she does not wear very often.   - I have advised her to soak the feet 1 time a day for the next few days in half and half warm water and vinegar.   - Make sure she is wearing good supportive athletic shoes.   - Avoid the Muck boots and the UGG boots since they do not have much cushion.   - Put Voltaren gel on the foot up to 4 times per day to decrease inflammation.            Skin    Tinea pedis of both feet (Chronic)    Current Assessment & Plan     - Get some over-the-counter Lamisil cream and put it between all the toes 2 times per day.   - Also use the Tinactin foot powder spray on the feet and in the shoes.   - After bathing, make sure she gets her feet very dry between the toes.              Exercising to Stay Healthy  To become healthy and stay healthy, it is recommended that you do moderate-intensity and vigorous-intensity exercise. You can tell that you are exercising at a moderate intensity if your heart starts beating faster and you start breathing faster but can still hold a conversation. You can tell that you are exercising at a vigorous intensity if you are  breathing much harder and faster and cannot hold a conversation while exercising.  How can exercise benefit me?  Exercising regularly is important. It has many health benefits, such as:  Improving overall fitness, flexibility, and endurance.  Increasing bone density.  Helping with weight control.  Decreasing body fat.  Increasing muscle strength and endurance.  Reducing stress and tension, anxiety, depression, or anger.  Improving overall health.  What guidelines should I follow while exercising?  Before you start a new exercise program, talk with your health care provider.  Do not exercise so much that you hurt yourself, feel dizzy, or get very short of breath.  Wear comfortable clothes and wear shoes with good support.  Drink plenty of water while you exercise to prevent dehydration or heat stroke.  Work out until your breathing and your heartbeat get faster (moderate intensity).  How often should I exercise?  Choose an activity that you enjoy, and set realistic goals. Your health care provider can help you make an activity plan that is individually designed and works best for you.  Exercise regularly as told by your health care provider. This may include:  Doing strength training two times a week, such as:  Lifting weights.  Using resistance bands.  Push-ups.  Sit-ups.  Yoga.  Doing a certain intensity of exercise for a given amount of time. Choose from these options:  A total of 150 minutes of moderate-intensity exercise every week.  A total of 75 minutes of vigorous-intensity exercise every week.  A mix of moderate-intensity and vigorous-intensity exercise every week.  Children, pregnant women, people who have not exercised regularly, people who are overweight, and older adults may need to talk with a health care provider about what activities are safe to perform. If you have a medical condition, be sure to talk with your health care provider before you start a new exercise program.  What are some exercise  ideas?  Moderate-intensity exercise ideas include:  Walking 1 mile (1.6 km) in about 15 minutes.  Biking.  Hiking.  Golfing.  Dancing.  Water aerobics.  Vigorous-intensity exercise ideas include:  Walking 4.5 miles (7.2 km) or more in about 1 hour.  Jogging or running 5 miles (8 km) in about 1 hour.  Biking 10 miles (16.1 km) or more in about 1 hour.  Lap swimming.  Roller-skating or in-line skating.  Cross-country skiing.  Vigorous competitive sports, such as football, basketball, and soccer.  Jumping rope.  Aerobic dancing.  What are some everyday activities that can help me get exercise?  Yard work, such as:  Pushing a .  Raking and bagging leaves.  Washing your car.  Pushing a stroller.  Shoveling snow.  Gardening.  Washing windows or floors.  How can I be more active in my day-to-day activities?  Use stairs instead of an elevator.  Take a walk during your lunch break.  If you drive, park your car farther away from your work or school.  If you take public transportation, get off one stop early and walk the rest of the way.  Stand up or walk around during all of your indoor phone calls.  Get up, stretch, and walk around every 30 minutes throughout the day.  Enjoy exercise with a friend. Support to continue exercising will help you keep a regular routine of activity.  Where to find more information  You can find more information about exercising to stay healthy from:  [place] Department of Health and Human Services: www.hhs.gov  Centers for Disease Control and Prevention (CDC): www.cdc.gov  Summary  Exercising regularly is important. It will improve your overall fitness, flexibility, and endurance.  Regular exercise will also improve your overall health. It can help you control your weight, reduce stress, and improve your bone density.  Do not exercise so much that you hurt yourself, feel dizzy, or get very short of breath.  Before you start a new exercise program, talk with your health care provider.  This  "information is not intended to replace advice given to you by your health care provider. Make sure you discuss any questions you have with your health care provider.  Document Revised: 04/15/2022 Document Reviewed: 04/15/2022  [employer] Patient Education © 2023 [employer] BMI for Adults  What is BMI?  Body mass index (BMI) is a number that is calculated from a person's weight and height. BMI can help estimate how much of a person's weight is composed of fat. BMI does not measure body fat directly. Rather, it is an alternative to procedures that directly measure body fat, which can be difficult and expensive.  BMI can help identify people who may be at higher risk for certain medical problems.  What are BMI measurements used for?  BMI is used as a screening tool to identify possible weight problems. It helps determine whether a person is obese, overweight, a healthy weight, or underweight.  BMI is useful for:  Identifying a weight problem that may be related to a medical condition or may increase the risk for medical problems.  Promoting changes, such as changes in diet and exercise, to help reach a healthy weight. BMI screening can be repeated to see if these changes are working.  How is BMI calculated?  BMI involves measuring your weight in relation to your height. Both height and weight are measured, and the BMI is calculated from those numbers. This can be done either in English ([place] or metric measurements. Note that charts and online BMI calculators are available to help you find your BMI quickly and easily without having to do these calculations yourself.  To calculate your BMI in English ([place] measurements:    Measure your weight in pounds (lb).  Multiply the number of pounds by 703.  For example, for a person who weighs 180 lb, multiply that number by 703, which equals 126,540.  Measure your height in inches. Then multiply that number by itself to get a measurement called \"inches squared.\"  For example, " "for a person who is 70 inches tall, the \"inches squared\" measurement is 70 inches x 70 inches, which equals 4,900 inches squared.  Divide the total from step 2 (number of lb x 703) by the total from step 3 (inches squared): 126,540 ÷ 4,900 = 25.8. This is your BMI.  To calculate your BMI in metric measurements:  Measure your weight in kilograms (kg).  Measure your height in meters (m). Then multiply that number by itself to get a measurement called \"meters squared.\"  For example, for a person who is 1.75 m tall, the \"meters squared\" measurement is 1.75 m x 1.75 m, which is equal to 3.1 meters squared.  Divide the number of kilograms (your weight) by the meters squared number. In this example: 70 ÷ 3.1 = 22.6. This is your BMI.  What do the results mean?  BMI charts are used to identify whether you are underweight, normal weight, overweight, or obese. The following guidelines will be used:  Underweight: BMI less than 18.5.  Normal weight: BMI between 18.5 and 24.9.  Overweight: BMI between 25 and 29.9.  Obese: BMI of 30 or above.  Keep these notes in mind:  Weight includes both fat and muscle, so someone with a muscular build, such as an athlete, may have a BMI that is higher than 24.9. In cases like these, BMI is not an accurate measure of body fat.  To determine if excess body fat is the cause of a BMI of 25 or higher, further assessments may need to be done by a health care provider.  BMI is usually interpreted in the same way for men and women.  Where to find more information  For more information about BMI, including tools to quickly calculate your BMI, go to these websites:  Centers for Disease Control and Prevention: www.cdc.gov  American Heart Association: www.heart.org  National Heart, Lung, and Blood [place]: www.nhlbi.nih.gov  Summary  Body mass index (BMI) is a number that is calculated from a person's weight and height.  BMI may help estimate how much of a person's weight is composed of fat. BMI can " help identify those who may be at higher risk for certain medical problems.  BMI can be measured using English measurements or metric measurements.  BMI charts are used to identify whether you are underweight, normal weight, overweight, or obese.  This information is not intended to replace advice given to you by your health care provider. Make sure you discuss any questions you have with your health care provider.  Document Revised: 05/31/2023 Document Reviewed: 07/17/2020  [employer] Patient Education © 2023 [employer]

## 2024-01-12 DIAGNOSIS — E66.9 OBESITY (BMI 35.0-39.9 WITHOUT COMORBIDITY): ICD-10-CM

## 2024-01-12 RX ORDER — PHENTERMINE HYDROCHLORIDE 37.5 MG/1
37.5 CAPSULE ORAL EVERY MORNING
Qty: 30 CAPSULE | Refills: 1 | Status: SHIPPED | OUTPATIENT
Start: 2024-01-12

## 2024-01-15 RX ORDER — VALACYCLOVIR HYDROCHLORIDE 1 G/1
1000 TABLET, FILM COATED ORAL DAILY
Qty: 90 TABLET | Refills: 3 | Status: SHIPPED | OUTPATIENT
Start: 2024-01-15

## 2024-03-01 PROCEDURE — 87636 SARSCOV2 & INF A&B AMP PRB: CPT | Performed by: NURSE PRACTITIONER

## 2024-03-01 PROCEDURE — 87205 SMEAR GRAM STAIN: CPT | Performed by: NURSE PRACTITIONER

## 2024-03-01 PROCEDURE — 87070 CULTURE OTHR SPECIMN AEROBIC: CPT | Performed by: NURSE PRACTITIONER

## 2024-03-04 ENCOUNTER — OFFICE VISIT (OUTPATIENT)
Dept: INTERNAL MEDICINE | Facility: CLINIC | Age: 54
End: 2024-03-04
Payer: COMMERCIAL

## 2024-03-04 VITALS
HEIGHT: 65 IN | BODY MASS INDEX: 36.62 KG/M2 | SYSTOLIC BLOOD PRESSURE: 120 MMHG | OXYGEN SATURATION: 99 % | HEART RATE: 57 BPM | TEMPERATURE: 98.4 F | DIASTOLIC BLOOD PRESSURE: 80 MMHG | WEIGHT: 219.8 LBS

## 2024-03-04 DIAGNOSIS — Z00.00 ANNUAL PHYSICAL EXAM: Primary | ICD-10-CM

## 2024-03-04 DIAGNOSIS — F32.1 CURRENT MODERATE EPISODE OF MAJOR DEPRESSIVE DISORDER WITHOUT PRIOR EPISODE: ICD-10-CM

## 2024-03-04 DIAGNOSIS — E78.2 MIXED HYPERLIPIDEMIA: ICD-10-CM

## 2024-03-04 DIAGNOSIS — I10 ESSENTIAL HYPERTENSION: ICD-10-CM

## 2024-03-04 PROCEDURE — 99396 PREV VISIT EST AGE 40-64: CPT | Performed by: INTERNAL MEDICINE

## 2024-03-04 NOTE — PROGRESS NOTES
[facility]     Blanka Carter  1970   [id number]      Patient Care Team:  Slava Hair MD as PCP - General  Slava Hair MD as PCP - Family Medicine    Chief Complaint::   Chief Complaint   Patient presents with    Annual Exam    Cough     UTC on 3/1, tested for everything, negative    Hypertension        HPI  The patient is a 52-year-old female who comes in for annual examination and for follow-up of hypertension, hyperlipidemia, and depression.    She continues to experience tiredness. She was doing well on Tuesday and went to work on Wednesday. She had a mild headache which she thought was due to her sinuses. On Thursday, she went to work and noticed scratchy throat. She started coughing and lost her voice. On Friday, she had a fever, could not get out of bed and could barely raise her head due to severe headache. She went to urgent care on Saturday because she thought this was different than her normal process. She was tested negative for COVID-19 at work on Thursday.  She has a productive cough.     She used to take phentermine daily but noted increasing jittery feeling in a week, so she started taking it once every other day. She has been on it for about 1.5 months. She goes for a walk at least twice a day.    Chronic Conditions:      Patient Active Problem List   Diagnosis    Disc disorder of cervical region    Obesity (BMI 35.0-39.9 without comorbidity)    Menopause    Allergic rhinitis    Ear pain, right    Annual physical exam    Bilateral hip pain    Current moderate episode of major depressive disorder without prior episode    Menopausal symptoms    Counseling for hormone replacement therapy    Essential hypertension    History of hysterectomy, supracervical    Women's annual routine gynecological examination    KONSTANTIN (obstructive sleep apnea)    Surgical menopause    Tinea pedis of both feet    Right foot pain    Mixed hyperlipidemia        Past Medical History:    Diagnosis Date    Allergic 1975    Seasonal    Cervical spondylosis     Herpes zoster without complication 2019    HSV infection     Hypertension 2020    Kidney stone 2015    Low back pain 2005    Im not sure of the date    Peripheral neuropathy     Right hand fingers get tingling and numb with excessive use    Seizure disorder 1990    resolved    Women's annual routine gynecological examination 2021       Past Surgical History:   Procedure Laterality Date     SECTION      COLONOSCOPY  2019    HYSTERECTOMY      LSH    SUBTOTAL HYSTERECTOMY  2013    TUBAL ABDOMINAL LIGATION         Family History   Problem Relation Age of Onset    Diabetes Father         type 2    Heart disease Father     Osteoarthritis Mother     Arthritis Mother     Other Mother         Osteoporosis    Breast cancer Neg Hx     Ovarian cancer Neg Hx        Social History     Socioeconomic History    Marital status:    Tobacco Use    Smoking status: Former     Current packs/day: 0.00     Average packs/day: 0.1 packs/day for 14.0 years (1.4 ttl pk-yrs)     Types: Cigarettes     Start date: 2003     Quit date: 2014     Years since quitting: 10.1    Smokeless tobacco: Never    Tobacco comments:     2 cigarettes per day   Vaping Use    Vaping status: Never Used   Substance and Sexual Activity    Alcohol use: Yes     Alcohol/week: 4.0 standard drinks of alcohol     Comment: Monthly    Drug use: No    Sexual activity: Yes     Partners: Male     Birth control/protection: Surgical       No Known Allergies      Current Outpatient Medications:     benzonatate (TESSALON) 200 MG capsule, Take 1 capsule by mouth 3 (Three) Times a Day As Needed for Cough., Disp: 21 capsule, Rfl: 0    estrogens, conjugated, (PREMARIN) 0.625 MG tablet, Take 1 tablet by mouth Daily., Disp: 90 tablet, Rfl: 3    lisinopril (PRINIVIL,ZESTRIL) 5 MG tablet, Take 1 tablet by mouth Daily., Disp: 90 tablet, Rfl: 1    metoprolol succinate  "XL (TOPROL-XL) 50 MG 24 hr tablet, Take 1 tablet by mouth once daily, Disp: 90 tablet, Rfl: 3    phentermine 37.5 MG capsule, Take 1 capsule by mouth Every Morning., Disp: 30 capsule, Rfl: 1    Pseudoephedrine-Ibuprofen (ADVIL COLD/SINUS PO), Take 1 tablet by mouth At Night As Needed., Disp: , Rfl:     valACYclovir (Valtrex) 1000 MG tablet, Take 1 tablet by mouth Daily., Disp: 90 tablet, Rfl: 3    amoxicillin-clavulanate (AUGMENTIN) 875-125 MG per tablet, Take 1 tablet by mouth 2 (Two) Times a Day., Disp: 14 tablet, Rfl: 0    Immunization History   Administered Date(s) Administered    COVID-19 (PFIZER) Purple Cap Monovalent 12/21/2020, 01/11/2021, 10/06/2021    Flu Vaccine Quad PF >36MO 10/24/2017    Fluzone Quad >6mos (Multi-dose) 10/14/2015, 10/01/2016, 11/02/2020    Hepatitis A 11/08/2018, 06/25/2019    Influenza, Unspecified 10/18/2018, 11/15/2021, 10/13/2022, 11/09/2023    Shingrix 12/18/2021, 03/31/2022    Tdap 07/17/2013, 06/14/2022        Health Maintenance Due   Topic Date Due    COVID-19 Vaccine (4 - 2023-24 season) 09/01/2023    ANNUAL PHYSICAL  01/25/2024    LIPID PANEL  01/27/2024        Review of Systems   Constitutional:  Positive for fatigue.   HENT:  Positive for sore throat.    Respiratory:  Positive for cough.    Neurological:  Positive for headache.        Vital Signs  Vitals:    03/04/24 1533   BP: 120/80   BP Location: Left arm   Patient Position: Sitting   Cuff Size: Adult   Pulse: 57   Temp: 98.4 °F (36.9 °C)   TempSrc: Infrared   SpO2: 99%   Weight: 99.7 kg (219 lb 12.8 oz)   Height: 164.3 cm (64.69\")   PainSc:   4   PainLoc: Teeth  Comment: from sinus pressure       Physical Exam  Constitutional:       Appearance: Normal appearance.   HENT:      Right Ear: Tympanic membrane and ear canal normal.      Left Ear: Tympanic membrane and ear canal normal.      Mouth/Throat:      Pharynx: Oropharynx is clear.      Comments: Mild posterior pharyngeal erythema.  Eyes:      Extraocular Movements: " Extraocular movements intact.      Pupils: Pupils are equal, round, and reactive to light.   Neck:      Vascular: No carotid bruit.   Cardiovascular:      Rate and Rhythm: Normal rate and regular rhythm.   Pulmonary:      Effort: Pulmonary effort is normal.      Breath sounds: Normal breath sounds.   Abdominal:      General: Bowel sounds are normal.      Palpations: Abdomen is soft.      Tenderness: There is no abdominal tenderness.   Musculoskeletal:      Cervical back: Normal range of motion and neck supple.      Right lower leg: No edema.      Left lower leg: No edema.   Lymphadenopathy:      Cervical: No cervical adenopathy.   Skin:     General: Skin is warm and dry.   Neurological:      Mental Status: She is alert and oriented to person, place, and time.      Cranial Nerves: No cranial nerve deficit.      Gait: Gait normal.   Psychiatric:         Mood and Affect: Mood normal.         Behavior: Behavior normal.          Procedures     Fall Risk Screen:  STEADI Fall Risk Assessment has not been completed.    Health Habits and Functional and Cognitive Screening:       No data to display                Depression [place]  PHQ-9 Total Score: 0     ACE III MINI             Assessment/Plan:    1. Annual physical exam  Other than recent acute viral illness, she has been doing very well. She has lost significant weight taking phentermine every other day. She is up to date on GYN care, mammography, and colorectal cancer screening.    2. Essential hypertension  Blood pressure is well controlled on metoprolol and lisinopril.  - CBC & Differential; Future    3. Current moderate episode of major depressive disorder without prior episode  This remains in remission without therapy.    4. Mixed hyperlipidemia  Lipid panel was pending. She will continue a healthy diet and weight loss.    - Comprehensive Metabolic Panel; Future  - Lipid Panel; Future  - Apolipoprotein B; Future     5. Viral upper respiratory infection.  This now  seems to be improving. She will call if she feels worse.    6. Obesity.  BMI today is just under 37, which is down 34 points and she has lost about 10 pounds with the help of phentermine. She may continue for another month and a half and then she needs to stop taking it to avoid tolerance.      Follow-up  The patient will follow up in 1 year.    Labs  Results for orders placed or performed during the hospital encounter of 03/01/24   COVID-19 and FLU A/B PCR, 1 HR TAT - Swab, Nasopharynx    Specimen: Nasopharynx; Swab   Result Value Ref Range    COVID19 Not Detected Not Detected - Ref. Range    Influenza A PCR Not Detected Not Detected    Influenza B PCR Not Detected Not Detected   Wound Culture - Swab, Oropharynx    Specimen: Oropharynx; Swab   Result Value Ref Range    Wound Culture Moderate growth (3+) Normal Throat Olivia     Gram Stain No WBCs seen     Gram Stain Few (2+) Epithelial cells seen     Gram Stain       Few (2+) Mixed bacterial morphotypes seen on Gram Stain   Covid-19 + Flu A&B AG, Veritor    Specimen: Swab   Result Value Ref Range    SARS Antigen Not Detected Not Detected, Presumptive Negative    Influenza A Antigen ELISA Not Detected Not Detected    Influenza B Antigen ELISA Not Detected Not Detected    Internal Control Passed Passed    Lot Number [id number]     Expiration Date 02/05/2025    POC Rapid Strep A    Specimen: Swab   Result Value Ref Range    Rapid Strep A Screen Negative     Internal Control Passed     Lot Number [id number]     Expiration Date 7-         Counseling was given to patient for the following topics: appropriate exercise 150 minutes per week, disease prevention, and healthy eating habits.    Plan of care reviewed with patient at the conclusion of today's visit. Education was provided regarding diagnosis, management, and any prescribed or recommended OTC medications.Patient verbalizes understanding of and agreement with management plan.         Slava Hair MD            Transcribed from ambient dictation for Slava Hair MD by Shannan Hill.   03/04/24   18:39 EST    Patient or patient representative verbalized consent to the visit recording.  I have personally performed the services described in this document as transcribed by the above individual, and it is both accurate and complete.

## 2024-03-05 RX ORDER — AMOXICILLIN AND CLAVULANATE POTASSIUM 875; 125 MG/1; MG/1
1 TABLET, FILM COATED ORAL 2 TIMES DAILY
Qty: 14 TABLET | Refills: 0 | Status: SHIPPED | OUTPATIENT
Start: 2024-03-05

## 2024-03-11 ENCOUNTER — LAB (OUTPATIENT)
Dept: LAB | Facility: HOSPITAL | Age: 54
End: 2024-03-11
Payer: COMMERCIAL

## 2024-03-11 DIAGNOSIS — I10 ESSENTIAL HYPERTENSION: ICD-10-CM

## 2024-03-11 DIAGNOSIS — E78.2 MIXED HYPERLIPIDEMIA: ICD-10-CM

## 2024-03-11 LAB
ALBUMIN SERPL-MCNC: 4.1 G/DL (ref 3.5–5.2)
ALBUMIN/GLOB SERPL: 1.4 G/DL
ALP SERPL-CCNC: 73 U/L (ref 39–117)
ALT SERPL W P-5'-P-CCNC: 30 U/L (ref 1–33)
ANION GAP SERPL CALCULATED.3IONS-SCNC: 9 MMOL/L (ref 5–15)
AST SERPL-CCNC: 20 U/L (ref 1–32)
BASOPHILS # BLD AUTO: 0.04 10*3/MM3 (ref 0–0.2)
BASOPHILS NFR BLD AUTO: 0.4 % (ref 0–1.5)
BILIRUB SERPL-MCNC: 0.2 MG/DL (ref 0–1.2)
BUN SERPL-MCNC: 14 MG/DL (ref 6–20)
BUN/CREAT SERPL: 17.5 (ref 7–25)
CALCIUM SPEC-SCNC: 9.7 MG/DL (ref 8.6–10.5)
CHLORIDE SERPL-SCNC: 103 MMOL/L (ref 98–107)
CHOLEST SERPL-MCNC: 169 MG/DL (ref 0–200)
CO2 SERPL-SCNC: 27 MMOL/L (ref 22–29)
CREAT SERPL-MCNC: 0.8 MG/DL (ref 0.57–1)
DEPRECATED RDW RBC AUTO: 45.9 FL (ref 37–54)
EGFRCR SERPLBLD CKD-EPI 2021: 87.1 ML/MIN/1.73
EOSINOPHIL # BLD AUTO: 0.18 10*3/MM3 (ref 0–0.4)
EOSINOPHIL NFR BLD AUTO: 1.9 % (ref 0.3–6.2)
ERYTHROCYTE [DISTWIDTH] IN BLOOD BY AUTOMATED COUNT: 13.2 % (ref 12.3–15.4)
GLOBULIN UR ELPH-MCNC: 2.9 GM/DL
GLUCOSE SERPL-MCNC: 94 MG/DL (ref 65–99)
HCT VFR BLD AUTO: 37.5 % (ref 34–46.6)
HDLC SERPL-MCNC: 49 MG/DL (ref 40–60)
HGB BLD-MCNC: 12.6 G/DL (ref 12–15.9)
IMM GRANULOCYTES # BLD AUTO: 0.08 10*3/MM3 (ref 0–0.05)
IMM GRANULOCYTES NFR BLD AUTO: 0.8 % (ref 0–0.5)
LDLC SERPL CALC-MCNC: 99 MG/DL (ref 0–100)
LDLC/HDLC SERPL: 1.96 {RATIO}
LYMPHOCYTES # BLD AUTO: 2.69 10*3/MM3 (ref 0.7–3.1)
LYMPHOCYTES NFR BLD AUTO: 28.2 % (ref 19.6–45.3)
MCH RBC QN AUTO: 31.4 PG (ref 26.6–33)
MCHC RBC AUTO-ENTMCNC: 33.6 G/DL (ref 31.5–35.7)
MCV RBC AUTO: 93.5 FL (ref 79–97)
MONOCYTES # BLD AUTO: 0.66 10*3/MM3 (ref 0.1–0.9)
MONOCYTES NFR BLD AUTO: 6.9 % (ref 5–12)
NEUTROPHILS NFR BLD AUTO: 5.9 10*3/MM3 (ref 1.7–7)
NEUTROPHILS NFR BLD AUTO: 61.8 % (ref 42.7–76)
NRBC BLD AUTO-RTO: 0 /100 WBC (ref 0–0.2)
PLATELET # BLD AUTO: 384 10*3/MM3 (ref 140–450)
PMV BLD AUTO: 9.3 FL (ref 6–12)
POTASSIUM SERPL-SCNC: 4 MMOL/L (ref 3.5–5.2)
PROT SERPL-MCNC: 7 G/DL (ref 6–8.5)
RBC # BLD AUTO: 4.01 10*6/MM3 (ref 3.77–5.28)
SODIUM SERPL-SCNC: 139 MMOL/L (ref 136–145)
TRIGL SERPL-MCNC: 120 MG/DL (ref 0–150)
VLDLC SERPL-MCNC: 21 MG/DL (ref 5–40)
WBC NRBC COR # BLD AUTO: 9.55 10*3/MM3 (ref 3.4–10.8)

## 2024-03-11 PROCEDURE — 85025 COMPLETE CBC W/AUTO DIFF WBC: CPT

## 2024-03-11 PROCEDURE — 36415 COLL VENOUS BLD VENIPUNCTURE: CPT

## 2024-03-11 PROCEDURE — 80061 LIPID PANEL: CPT

## 2024-03-11 PROCEDURE — 80053 COMPREHEN METABOLIC PANEL: CPT

## 2024-03-11 PROCEDURE — 82172 ASSAY OF APOLIPOPROTEIN: CPT

## 2024-03-14 LAB — APO B SERPL-MCNC: 97 MG/DL

## 2024-04-17 DIAGNOSIS — E66.9 OBESITY (BMI 35.0-39.9 WITHOUT COMORBIDITY): ICD-10-CM

## 2024-04-17 RX ORDER — PHENTERMINE HYDROCHLORIDE 37.5 MG/1
37.5 CAPSULE ORAL EVERY MORNING
Qty: 30 CAPSULE | Refills: 1 | Status: SHIPPED | OUTPATIENT
Start: 2024-04-17

## 2024-04-17 NOTE — TELEPHONE ENCOUNTER
Rx Refill Note  Requested Prescriptions     Pending Prescriptions Disp Refills    phentermine 37.5 MG capsule 30 capsule 1     Sig: Take 1 capsule by mouth Every Morning.      Last refill:  1/12/24 #30/1  Last office visit with prescribing clinician: 3/4/2024   Last telemedicine visit with prescribing clinician: Visit date not found   Next office visit with prescribing clinician: 3/5/2025                         Would you like a call back once the refill request has been completed: [] Yes [] No    If the office needs to give you a call back, can they leave a voicemail: [] Yes [] No    Sophie Ware RN  04/17/24, 15:08 EDT

## 2024-07-25 ENCOUNTER — OFFICE VISIT (OUTPATIENT)
Dept: NEUROSURGERY | Facility: CLINIC | Age: 54
End: 2024-07-25
Payer: COMMERCIAL

## 2024-07-25 VITALS
HEIGHT: 65 IN | TEMPERATURE: 98 F | SYSTOLIC BLOOD PRESSURE: 140 MMHG | BODY MASS INDEX: 37.15 KG/M2 | WEIGHT: 223 LBS | DIASTOLIC BLOOD PRESSURE: 90 MMHG

## 2024-07-25 DIAGNOSIS — M47.812 CERVICAL SPONDYLOSIS WITHOUT MYELOPATHY: Primary | ICD-10-CM

## 2024-07-25 DIAGNOSIS — H53.8 BLURRED VISION, BILATERAL: ICD-10-CM

## 2024-07-25 PROCEDURE — 99214 OFFICE O/P EST MOD 30 MIN: CPT | Performed by: PHYSICIAN ASSISTANT

## 2024-07-25 NOTE — PROGRESS NOTES
Patient: Blanka Carter  : 1970  Chart #: [id number]    Date of Service: 2024    CC: Right-sided neck pain, blurred vision, dizziness      Neck Pain   Pertinent negatives include no chest pain, fever, headaches, numbness, photophobia, trouble swallowing or weakness.   Leg Swelling  Associated symptoms include neck pain. Pertinent negatives include no abdominal pain, arthralgias, chest pain, chills, congestion, coughing, diaphoresis, fatigue, fever, headaches, joint swelling, myalgias, nausea, numbness, rash, sore throat, vomiting or weakness.   Arm Pain   Pertinent negatives include no chest pain or numbness.       This is a 54-year-old female who works in the [employer] who presents with 2 weeks of worsening neck pain that radiates up the neck and radiates to the top of the shoulder.  At times she will get numbness and tingling into the fingers and thumb of the right hand.  She is also been having symptoms of blurry vision and lightheadedness with hyperextension of the neck and lateral rotation.  She has previously had a cervical MRI in  which showed a degenerative bulging disc at C5-6 With significant right foraminal narrowing and she had nerve conduction studies which were normal.  She denies any weakness in the  or arms.  No complaints of hearing loss or tinnitus.    Chronic Illnesses:  Past Medical History:   Diagnosis Date    Allergic 1975    Seasonal    Cervical spondylosis     Herpes zoster without complication 2019    HSV infection     Hypertension 2020    Kidney stone 2015    Low back pain 2005    Im not sure of the date    Peripheral neuropathy     Right hand fingers get tingling and numb with excessive use    Seizure disorder 1990    resolved    Women's annual routine gynecological examination 2021         Past Surgical History:   Procedure Laterality Date     SECTION      COLONOSCOPY  2019    HYSTERECTOMY      LSH    SUBTOTAL HYSTERECTOMY   2013    TUBAL ABDOMINAL LIGATION         No Known Allergies      Current Outpatient Medications:     estrogens, conjugated, (Premarin) 0.625 MG tablet, Take 1 tablet by mouth Daily., Disp: 90 tablet, Rfl: 3    metoprolol succinate XL (TOPROL-XL) 50 MG 24 hr tablet, Take 1 tablet by mouth once daily, Disp: 90 tablet, Rfl: 3    phentermine 37.5 MG capsule, Take 1 capsule by mouth Every Morning., Disp: 30 capsule, Rfl: 1    Pseudoephedrine-Ibuprofen (ADVIL COLD/SINUS PO), Take 1 tablet by mouth At Night As Needed., Disp: , Rfl:     valACYclovir (Valtrex) 1000 MG tablet, Take 1 tablet by mouth Daily., Disp: 90 tablet, Rfl: 3    Social History     Socioeconomic History    Marital status:    Tobacco Use    Smoking status: Former     Current packs/day: 0.00     Average packs/day: 0.1 packs/day for 14.0 years (1.4 ttl pk-yrs)     Types: Cigarettes     Start date: 1/1/2003     Quit date: 1/1/2014     Years since quitting: 10.5     Passive exposure: Past    Smokeless tobacco: Never    Tobacco comments:     2 cigarettes per day   Vaping Use    Vaping status: Never Used   Substance and Sexual Activity    Alcohol use: Yes     Alcohol/week: 2.0 standard drinks of alcohol     Types: 2 Drinks containing 0.5 oz of alcohol per week     Comment: Monthly    Drug use: No    Sexual activity: Not Currently     Partners: Male     Birth control/protection: Surgical       Family History   Problem Relation Age of Onset    Diabetes Father         type 2    Heart disease Father     Osteoarthritis Mother     Arthritis Mother     Other Mother         Osteoporosis    Breast cancer Neg Hx     Ovarian cancer Neg Hx                Social History    Tobacco Use      Smoking status: Former        Packs/day: 0.00        Years: 0.1 packs/day for 14.0 years (1.4 ttl pk-yrs)        Types: Cigarettes        Start date: 1/1/2003        Quit date: 1/1/2014        Years since quitting: 10.5        Passive exposure: Past      Smokeless tobacco: Never       Tobacco comments: 2 cigarettes per day       Review of Systems   Constitutional:  Negative for activity change, appetite change, chills, diaphoresis, fatigue, fever and unexpected weight change.   HENT:  Negative for congestion, dental problem, drooling, ear discharge, ear pain, facial swelling, hearing loss, mouth sores, nosebleeds, postnasal drip, rhinorrhea, sinus pressure, sinus pain, sneezing, sore throat, tinnitus, trouble swallowing and voice change.    Eyes:  Negative for photophobia, pain, discharge, redness, itching and visual disturbance.   Respiratory:  Negative for apnea, cough, choking, chest tightness, shortness of breath, wheezing and stridor.    Cardiovascular:  Negative for chest pain, palpitations and leg swelling.   Gastrointestinal:  Negative for abdominal distention, abdominal pain, anal bleeding, blood in stool, constipation, diarrhea, nausea, rectal pain and vomiting.   Endocrine: Negative for cold intolerance, heat intolerance, polydipsia, polyphagia and polyuria.   Genitourinary:  Negative for decreased urine volume, difficulty urinating, dyspareunia, dysuria, enuresis, flank pain, frequency, genital sores, hematuria, menstrual problem, pelvic pain, urgency, vaginal bleeding, vaginal discharge and vaginal pain.   Musculoskeletal:  Positive for neck pain. Negative for arthralgias, back pain, gait problem, joint swelling, myalgias and neck stiffness.   Skin:  Negative for color change, pallor, rash and wound.   Allergic/Immunologic: Negative for environmental allergies, food allergies and immunocompromised state.   Neurological:  Negative for dizziness, tremors, seizures, syncope, facial asymmetry, speech difficulty, weakness, light-headedness, numbness and headaches.   Hematological:  Negative for adenopathy. Does not bruise/bleed easily.   Psychiatric/Behavioral:  Negative for agitation, behavioral problems, confusion, decreased concentration, dysphoric mood, hallucinations, self-injury,  "sleep disturbance and suicidal ideas. The patient is not nervous/anxious and is not hyperactive.         Gait & Balance Assessment:  Risk assessment for falls. Fall precautions:  such as;   Using gait aids a cane, walker at the appropriate height at all times for ambulation or if necessary a wheelchair  Removing all area rugs and coffee tables to create a safe environment at home  Ensure clean, dry floors  Wearing supportive footwear and properly fitting clothing  Ensure bed/chair is appropriate height and patient's feet can touch the floor  Using a shower transfer bench  Using walk-in shower and having shower safety bars installed  Ensure proper lighting, minimize glare  Have nightlights operational and in use  Participation in an exercise program for gait training, balance training and strength  Avoid carrying laundry up and down steps  Ensure proper compliance and organization of medications to avoid errors   Avoid use of over the counter sedatives and alcohol consumption  Ensure easy access to call bell, glasses, TV control, telephone  Ensure glasses/hearing aids are in use or close by (on top of night table)     Physical examination:  Blood pressure 140/90, temperature 98 °F (36.7 °C), temperature source Infrared, height 165.1 cm (65\"), weight 101 kg (223 lb), last menstrual period 12/01/2013, not currently breastfeeding.  HEENT- normocephalic, atraumatic, sclera clear  Lungs-normal expansion, no wheezing  Heart-regular rate and rhythm  Extremities-positive pulses, no edema    Neurologic Exam  WDWN WM F  A/A/C, speech clear, attention normal, conversant, answers questions appropriately, good historian.  Cranial nerves II through XII are intact.  Sensation is intact.  Gait is normal, balance is normal.   No tremors are noted.  Reflexes are increased in the right upper extremity, left biceps is normal slightly increased in the left brachioradialis and left triceps  Schuster is negative. Clonus is negative. "   Palpation of the neck is mildly tender.    Radiographic Imaging:  For my review is an MRI of the cervical spine from 2021 which shows mild degenerative disc disease throughout the cervical spine consistent with cervical spondylosis.  She does have a bulging disc at C5-6 which does show significant right foraminal narrowing.    Medical Decision Making  Diagnoses and all orders for this visit:    1. Cervical spondylosis without myelopathy (Primary)  -     MRI Cervical Spine Without Contrast; Future  -     MRI Angiogram Head Without Contrast; Future  -     Ambulatory Referral to Physical Therapy for Evaluation & Treatment    2. Blurred vision, bilateral    Ms. Carter is a 54-year-old right-handed female who has 2 weeks of right-sided neck pain and symptoms of transient dizziness and blurred vision with neck hyperextension and lateral rotation of the neck.  I reviewed an MRI of the cervical spine from 2021 and I have requested a follow-up cervical MRI to evaluate for nerve root compression which would be treatable with surgery or pain management.  I have also ordered an MRA to evaluate the vertebral basilar arteries for vertebrobasilar insufficiency.  The patient is in agreement with this plan.  She is currently using Motrin I have recommended that she add an additional Tylenol as well as Salonpas patches to the right side of the neck.  I have given her prescription to start physical therapy for cervical Lorenzo exercises.  She will return to see me back in the office when her diagnostic studies are completed.    Including assessment, review of prior documentation, review and interpretation of new diagnostic studies, discussing these findings with the patient and documentation, more than 30 minutes total time was spent on this appointment.    Any copied data from previous notes included in the (1) HPI, (2) PE, (3) MDM and/or assessment and plan has been reviewed and is accurate as of this day.    Ilene Neil,  PAC    Patient Care Team:  Slava Hair MD as PCP - General  Slava Hair MD as PCP - Family [facility]

## 2024-08-10 ENCOUNTER — HOSPITAL ENCOUNTER (OUTPATIENT)
Dept: MRI IMAGING | Facility: HOSPITAL | Age: 54
Discharge: HOME | End: 2024-08-10
Payer: COMMERCIAL

## 2024-08-10 DIAGNOSIS — M47.812 CERVICAL SPONDYLOSIS WITHOUT MYELOPATHY: ICD-10-CM

## 2024-08-10 PROCEDURE — 70544 MR ANGIOGRAPHY HEAD W/O DYE: CPT

## 2024-08-10 PROCEDURE — 72141 MRI NECK SPINE W/O DYE: CPT

## 2024-08-15 ENCOUNTER — OFFICE VISIT (OUTPATIENT)
Dept: NEUROSURGERY | Facility: CLINIC | Age: 54
End: 2024-08-15
Payer: COMMERCIAL

## 2024-08-15 VITALS
SYSTOLIC BLOOD PRESSURE: 122 MMHG | WEIGHT: 222 LBS | TEMPERATURE: 97.1 F | BODY MASS INDEX: 37.9 KG/M2 | DIASTOLIC BLOOD PRESSURE: 80 MMHG | HEIGHT: 64 IN

## 2024-08-15 DIAGNOSIS — M47.812 CERVICAL SPONDYLOSIS WITHOUT MYELOPATHY: Primary | ICD-10-CM

## 2024-08-15 PROCEDURE — 99214 OFFICE O/P EST MOD 30 MIN: CPT | Performed by: PHYSICIAN ASSISTANT

## 2024-08-15 RX ORDER — IBUPROFEN 800 MG/1
800 TABLET ORAL EVERY 8 HOURS PRN
Qty: 90 TABLET | Refills: 1 | Status: SHIPPED | OUTPATIENT
Start: 2024-08-15

## 2024-08-15 NOTE — PROGRESS NOTES
Patient: Blanka Carter  : 1970  Chart #: [id number]    Date of Service: 8/15/2024    CC: Right-sided neck pain, blurred vision, dizziness      Neck Pain   Pertinent negatives include no chest pain, fever, headaches, numbness, photophobia, trouble swallowing or weakness.   Leg Swelling  Associated symptoms include neck pain. Pertinent negatives include no abdominal pain, arthralgias, chest pain, chills, congestion, coughing, diaphoresis, fatigue, fever, headaches, joint swelling, myalgias, nausea, numbness, rash, sore throat, vomiting or weakness.   Arm Pain   Pertinent negatives include no chest pain or numbness.       This is a 54-year-old female who works in the [employer] who presents with 2 weeks of worsening neck pain that radiates up the neck and radiates to the top of the shoulder.  At times she will get numbness and tingling into the fingers and thumb of the right hand.  She is also been having symptoms of blurry vision and lightheadedness with hyperextension of the neck and lateral rotation.  She has previously had a cervical MRI in  which showed a degenerative bulging disc at C5-6 With significant right foraminal narrowing and she had nerve conduction studies which were normal.  She denies any weakness in the  or arms.  No complaints of hearing loss or tinnitus.  She presents today for follow-up with new studies.    Chronic Illnesses:  Past Medical History:   Diagnosis Date    Allergic 1975    Seasonal    Cervical spondylosis     Herpes zoster without complication 2019    HSV infection     Hypertension 2020    Kidney stone 2015    Low back pain 2005    Im not sure of the date    Peripheral neuropathy     Right hand fingers get tingling and numb with excessive use    Seizure disorder 1990    resolved    Women's annual routine gynecological examination 2021         Past Surgical History:   Procedure Laterality Date     SECTION      COLONOSCOPY  2019     HYSTERECTOMY      L[facility]    SUBTOTAL HYSTERECTOMY  2013    TUBAL ABDOMINAL LIGATION         No Known Allergies      Current Outpatient Medications:     estrogens, conjugated, (Premarin) 0.625 MG tablet, Take 1 tablet by mouth Daily., Disp: 90 tablet, Rfl: 3    metoprolol succinate XL (TOPROL-XL) 50 MG 24 hr tablet, Take 1 tablet by mouth once daily, Disp: 90 tablet, Rfl: 3    phentermine 37.5 MG capsule, Take 1 capsule by mouth Every Morning., Disp: 30 capsule, Rfl: 1    Pseudoephedrine-Ibuprofen (ADVIL COLD/SINUS PO), Take 1 tablet by mouth At Night As Needed., Disp: , Rfl:     valACYclovir (Valtrex) 1000 MG tablet, Take 1 tablet by mouth Daily., Disp: 90 tablet, Rfl: 3    ibuprofen (ADVIL,MOTRIN) 800 MG tablet, Take 1 tablet by mouth Every 8 (Eight) Hours As Needed for Mild Pain., Disp: 90 tablet, Rfl: 1    Social History     Socioeconomic History    Marital status:    Tobacco Use    Smoking status: Former     Current packs/day: 0.00     Average packs/day: 0.1 packs/day for 14.0 years (1.4 ttl pk-yrs)     Types: Cigarettes     Start date: 1/1/2003     Quit date: 1/1/2014     Years since quitting: 10.6     Passive exposure: Past    Smokeless tobacco: Never    Tobacco comments:     2 cigarettes per day   Vaping Use    Vaping status: Never Used   Substance and Sexual Activity    Alcohol use: Yes     Alcohol/week: 2.0 standard drinks of alcohol     Types: 2 Drinks containing 0.5 oz of alcohol per week     Comment: Monthly    Drug use: No    Sexual activity: Not Currently     Partners: Male     Birth control/protection: Surgical       Family History   Problem Relation Age of Onset    Diabetes Father         type 2    Heart disease Father     Osteoarthritis Mother     Arthritis Mother     Breast cancer Neg Hx     Ovarian cancer Neg Hx                Social History    Tobacco Use      Smoking status: Former        Packs/day: 0.00        Years: 0.1 packs/day for 14.0 years (1.4 ttl pk-yrs)        Types:  Cigarettes        Start date: 1/1/2003        Quit date: 1/1/2014        Years since quitting: 10.6        Passive exposure: Past      Smokeless tobacco: Never      Tobacco comments: 2 cigarettes per day       Review of Systems   Constitutional:  Negative for activity change, appetite change, chills, diaphoresis, fatigue, fever and unexpected weight change.   HENT:  Negative for congestion, dental problem, drooling, ear discharge, ear pain, facial swelling, hearing loss, mouth sores, nosebleeds, postnasal drip, rhinorrhea, sinus pressure, sinus pain, sneezing, sore throat, tinnitus, trouble swallowing and voice change.    Eyes:  Negative for photophobia, pain, discharge, redness, itching and visual disturbance.   Respiratory:  Negative for apnea, cough, choking, chest tightness, shortness of breath, wheezing and stridor.    Cardiovascular:  Negative for chest pain, palpitations and leg swelling.   Gastrointestinal:  Negative for abdominal distention, abdominal pain, anal bleeding, blood in stool, constipation, diarrhea, nausea, rectal pain and vomiting.   Endocrine: Negative for cold intolerance, heat intolerance, polydipsia, polyphagia and polyuria.   Genitourinary:  Negative for decreased urine volume, difficulty urinating, dyspareunia, dysuria, enuresis, flank pain, frequency, genital sores, hematuria, menstrual problem, pelvic pain, urgency, vaginal bleeding, vaginal discharge and vaginal pain.   Musculoskeletal:  Positive for neck pain. Negative for arthralgias, back pain, gait problem, joint swelling, myalgias and neck stiffness.   Skin:  Negative for color change, pallor, rash and wound.   Allergic/Immunologic: Negative for environmental allergies, food allergies and immunocompromised state.   Neurological:  Negative for dizziness, tremors, seizures, syncope, facial asymmetry, speech difficulty, weakness, light-headedness, numbness and headaches.   Hematological:  Negative for adenopathy. Does not  "bruise/bleed easily.   Psychiatric/Behavioral:  Negative for agitation, behavioral problems, confusion, decreased concentration, dysphoric mood, hallucinations, self-injury, sleep disturbance and suicidal ideas. The patient is not nervous/anxious and is not hyperactive.         Gait & Balance Assessment:  Risk assessment for falls. Fall precautions:  such as;   Using gait aids a cane, walker at the appropriate height at all times for ambulation or if necessary a wheelchair  Removing all area rugs and coffee tables to create a safe environment at home  Ensure clean, dry floors  Wearing supportive footwear and properly fitting clothing  Ensure bed/chair is appropriate height and patient's feet can touch the floor  Using a shower transfer bench  Using walk-in shower and having shower safety bars installed  Ensure proper lighting, minimize glare  Have nightlights operational and in use  Participation in an exercise program for gait training, balance training and strength  Avoid carrying laundry up and down steps  Ensure proper compliance and organization of medications to avoid errors   Avoid use of over the counter sedatives and alcohol consumption  Ensure easy access to call bell, glasses, TV control, telephone  Ensure glasses/hearing aids are in use or close by (on top of night table)     Physical examination:  Blood pressure 122/80, temperature 97.1 °F (36.2 °C), temperature source Infrared, height 162.6 cm (64\"), weight 101 kg (222 lb), last menstrual period 12/01/2013, not currently breastfeeding.  HEENT- normocephalic, atraumatic, sclera clear  Lungs-normal expansion, no wheezing  Heart-regular rate and rhythm  Extremities-positive pulses, no edema    Neurologic Exam  WDWN WM F  A/A/C, speech clear, attention normal, conversant, answers questions appropriately, good historian.  Cranial nerves II through XII are intact.  Sensation is intact.  Gait is normal, balance is normal.   No tremors are noted.  Reflexes are " increased in the right upper extremity, left biceps is normal slightly increased in the left brachioradialis and left triceps  Schuster is negative. Clonus is negative.   Palpation of the neck is mildly tender.    Radiographic Imaging:  For my review is an MRI of the cervical spine 8/10/2024, this shows multilevel degenerative disc disease with severe right foraminal narrowing at C4-5 and C5-6.  MRA was within normal limits.    Medical Decision Making  Diagnoses and all orders for this visit:    1. Cervical spondylosis without myelopathy (Primary)    Other orders  -     ibuprofen (ADVIL,MOTRIN) 800 MG tablet; Take 1 tablet by mouth Every 8 (Eight) Hours As Needed for Mild Pain.  Dispense: 90 tablet; Refill: 1      Ms. Carter is a 54-year-old right-handed female right-sided neck pain and symptoms of transient dizziness and blurred vision with neck hyperextension and lateral rotation of the neck. She is currently using Motrin,  I have recommended that she add an additional Tylenol as well as Salonpas patches to the right side of the neck.  I have given her prescription to start physical therapy for cervical Lorenzo exercises.  We discussed options of conservative treatment with physical therapy and medications, I have called in 800 of Motrin.  If she fails with this conservative treatment and suffers with more neck and arm symptoms then we will have discussed referral to pain management for an injection.  She will keep me updated if she has any worsening symptoms.    Including assessment, review of prior documentation, review and interpretation of new diagnostic studies, discussing these findings with the patient and documentation, more than 30 minutes total time was spent on this appointment.    Any copied data from previous notes included in the (1) HPI, (2) PE, (3) MDM and/or assessment and plan has been reviewed and is accurate as of this day.    Ilene Neil, PAC    Patient Care Team:  Slava Hair MD as  PCP - General  Slava Hair MD as PCP - [facility]

## 2024-11-18 RX ORDER — VALACYCLOVIR HYDROCHLORIDE 1 G/1
1000 TABLET, FILM COATED ORAL DAILY
Qty: 90 TABLET | Refills: 3 | Status: SHIPPED | OUTPATIENT
Start: 2024-11-18

## 2024-11-18 NOTE — TELEPHONE ENCOUNTER
LOV (Last Office Visit) 6/5/2023  NOV (Next Office Visit) not scheduled, called to schedule annual, left voicemail

## 2024-11-25 ENCOUNTER — OFFICE VISIT (OUTPATIENT)
Dept: ORTHOPEDIC SURGERY | Facility: CLINIC | Age: 54
End: 2024-11-25
Payer: COMMERCIAL

## 2024-11-25 VITALS
BODY MASS INDEX: 37.73 KG/M2 | SYSTOLIC BLOOD PRESSURE: 138 MMHG | HEIGHT: 64 IN | WEIGHT: 221 LBS | DIASTOLIC BLOOD PRESSURE: 88 MMHG

## 2024-11-25 DIAGNOSIS — M72.0 DUPUYTREN'S CONTRACTURE OF HAND: Primary | ICD-10-CM

## 2024-11-25 DIAGNOSIS — M18.10 ARTHRITIS OF CARPOMETACARPAL (CMC) JOINT OF THUMB: ICD-10-CM

## 2024-11-25 DIAGNOSIS — M79.642 LEFT HAND PAIN: ICD-10-CM

## 2024-11-25 RX ORDER — TRIAMCINOLONE ACETONIDE 40 MG/ML
20 INJECTION, SUSPENSION INTRA-ARTICULAR; INTRAMUSCULAR
Status: COMPLETED | OUTPATIENT
Start: 2024-11-25 | End: 2024-11-25

## 2024-11-25 RX ORDER — LIDOCAINE HYDROCHLORIDE 10 MG/ML
0.5 INJECTION, SOLUTION EPIDURAL; INFILTRATION; INTRACAUDAL; PERINEURAL
Status: COMPLETED | OUTPATIENT
Start: 2024-11-25 | End: 2024-11-25

## 2024-11-25 RX ADMIN — LIDOCAINE HYDROCHLORIDE 0.5 ML: 10 INJECTION, SOLUTION EPIDURAL; INFILTRATION; INTRACAUDAL; PERINEURAL at 13:49

## 2024-11-25 RX ADMIN — TRIAMCINOLONE ACETONIDE 20 MG: 40 INJECTION, SUSPENSION INTRA-ARTICULAR; INTRAMUSCULAR at 13:49

## 2024-11-25 NOTE — PROGRESS NOTES
"                                                                 [facility]     Office Visit       Date: 11/25/2024   Patient Name: Blanka Carter  MRN: [id number]  YOB: 1970    Referring Physician: No ref. provider found     Chief Complaint:   Chief Complaint   Patient presents with    Left Hand - Pain     History of Present Illness:   Blanka Carter is a 54 y.o. female right-hand-dominant presenting to clinic as a new patient with complaints of left thumb pain and a nodule.  Patient reports symptoms of been present for several months.  She states that regarding her basilar thumb pain, this is worse with gripping and lifting.  Regarding the nodule to the palm of her hand this is nontender at rest but painful with heavy gripping and lifting.  She denies any contractures.  No family history of nodule formation or contractures to the digits.  No numbness or tingling.  She has had no prior bracing or injections.  No other complaints or concerns.    Past medical history: KONSTANTIN, hypertension, HLD    Subjective   Review of Systems:   Review of Systems   Pertinent review of systems per HPI    I reviewed the patient's chief complaint, history of present illness, review of systems, past medical history, surgical history, family history, social history, medications and allergy list in the EMR on 11/25/2024 and agree with the findings above.    Objective    Vital Signs:   Vitals:    11/25/24 1326   BP: 138/88   Weight: 100 kg (221 lb)   Height: 162.6 cm (64.02\")     BMI:      General: No acute distress. Alert and oriented.   Cardiovascular: Palpable radial pulse.   Respiratory: Breathing is nonlabored.     Ortho Exam:  Examination of the left upper extremity demonstrates a Dupuytren's nodule in the palmar aspect of the left hand in line with the ring finger.  No large central or spiral cords.  No MCP or PIP joint contractures.  There is a small pit noted along the nodule.  No other skin " lesions or abrasions.  She is tender over the thumb CMC joint with positive CMC grind.  Nontender at the thumb MCP joint without hyperextension.  Negative Tinel, Durkan's, Phalen's at the wrist.  Sensation is intact to light touch throughout the hand.  Warm well-perfused distally.    Imaging / Studies:    Imaging Results (Last 24 Hours)       Procedure Component Value Units Date/Time    XR Hand 3+ View Left [[id number]] Resulted: 11/25/24 1338     Updated: 11/25/24 1338    Narrative:      Left Hand X-Ray    Indication: Pain    Views:  PA, Lateral, and Oblique     Comparison:  None    Findings:  No fractures or dislocation. No bony lesions. Normal soft tissues.  Mild   joint space narrowing noted at the thumb CMC joint.    Impression:   Mild degenerative changes at the thumb CMC joint.             EMG/NCS performed on 12/8/2021:    Normal NCS/EMG of right arm and neck    Specifically, no electrophysiologic evidence for median or ulnar nerve entrapment is seen at this time    No evidence for radiculopathy is seen in the right arm     Procedure Note:  After reviewing the risks, benefits and alternatives to a steroid injection, which include but are not limited to; hypopigmentation, fat necrosis/atrophy, pain, swelling, bleeding, bruising, damage to nearby nerves/vessels, allergic reaction , transient elevation in blood glucose levels and infection a verbal consent was obtained. A time-out was then performed and the affected hand was prepped with chlorhexadine soap and ethyl chloride was used to numb the skin. The left thumb CMC joint was injected with 0.5cc: 0.5cc mixture of Kenalog - 40 mg/ml and Lidocaine - 1% / 2 ml. The injection was well tolerated and a sterile dressing was applied. There were no complications. I advised the patient that they might experience some local discomfort for the next couple days and can apply ice to the site as needed.    Assessment / Plan    Assessment/Plan:   Blanka Carter is a 54  y.o. female with left thumb CMC arthritis and left hand Dupuytren's nodule.    I discussed with the patient their clinical and radiographic findings demonstrate basal joint arthritis.  We had a lengthy discussion regarding the pathophysiology of their diagnosis.  Both conservative and surgical options were discussed.  Conservative treatments in the form of: anti-inflammatories, bracing (push meta /CMC comfort cool wrap/Neoprene thumb sleeve), formal hand therapy, and injection were presented.  Operative treatments in the form of trapeziectomy with FCR to APL suspensionplasty was also presented, including the expected postoperative course.  After expressing understanding of all options, the patient elects to proceed with bracing, anti-inflammatories, and corticosteroid injection.      Additionally, I discussed with the patient their clinical findings demonstrate Dupuytren's contracture affecting the palmar aspect of the left hand.  We had a lengthy discussion regarding the pathophysiology of their diagnosis.  It was explained that Dupuytren's is a genetic disorder that affects the palmar fascia of the hands.  This leads to pit, nodule, and cord formation and can cause contractures of the MCP and PIP joints.  These are not painful but can lead to functional difficulties given the development of contractures.  Options were discussed including continued observation, collagenase injection, needle aponeurotomy, and surgical intervention with open partial palmar/digital fasciectomy.  After expressing understanding of all options, the patient elects to proceed with continued observation.  She does not have any contractures or large cords.  Will continue to monitor for any progression.  She will see me back in 3 months for evaluation of her thumb CMC arthritis.       ICD-10-CM ICD-9-CM   1. Dupuytren's contracture of hand  M72.0 728.6   2. Arthritis of carpometacarpal (CMC) joint of thumb  M18.10 716.94   3. Left hand  pain  M79.642 729.5     Follow Up:   Return in about 3 months (around 2/25/2025).      Lashaun Mark MD  [facility]

## 2024-11-25 NOTE — PROGRESS NOTES
Procedure   - Hand/Upper Extremity Injection: L thumb CMC for osteoarthritis on 11/25/2024 1:49 PM  Indications: pain  Details: 27 G needle, dorsal approach  Medications: 0.5 mL lidocaine PF 1% 1 %; 20 mg triamcinolone acetonide 40 MG/ML  Outcome: tolerated well, no immediate complications  Procedure, treatment alternatives, risks and benefits explained, specific risks discussed. Consent was given by the patient. Immediately prior to procedure a time out was called to verify the correct patient, procedure, equipment, support staff and site/side marked as required. Patient was prepped and draped in the usual sterile fashion.

## 2025-01-13 NOTE — TELEPHONE ENCOUNTER
Rx Refill Note  Requested Prescriptions     Pending Prescriptions Disp Refills    metoprolol succinate XL (TOPROL-XL) 50 MG 24 hr tablet 90 tablet 3     Sig: Take 1 tablet by mouth once daily      Last office visit with prescribing clinician:  3/4/24  Last telemedicine visit with prescribing clinician: Visit date not found   Next office visit with prescribing clinician: 3/5/25                        Would you like a call back once the refill request has been completed: [] Yes [] No    If the office needs to give you a call back, can they leave a voicemail: [] Yes [] No    Sophie Ware RN  01/13/25, 10:50 EST

## 2025-01-14 RX ORDER — METOPROLOL SUCCINATE 50 MG/1
50 TABLET, EXTENDED RELEASE ORAL DAILY
Qty: 90 TABLET | Refills: 3 | Status: SHIPPED | OUTPATIENT
Start: 2025-01-14

## 2025-01-15 ENCOUNTER — TELEPHONE (OUTPATIENT)
Dept: OBSTETRICS AND GYNECOLOGY | Facility: CLINIC | Age: 55
End: 2025-01-15
Payer: COMMERCIAL

## 2025-01-15 NOTE — TELEPHONE ENCOUNTER
Information provided to pt over phone for savings information. She demonstrates understanding and appreciation

## 2025-01-15 NOTE — TELEPHONE ENCOUNTER
Rashaun pt:        Patient called, wanting to know if we have a premarin discount card. Please advise

## 2025-03-05 ENCOUNTER — OFFICE VISIT (OUTPATIENT)
Dept: INTERNAL MEDICINE | Facility: CLINIC | Age: 55
End: 2025-03-05
Payer: COMMERCIAL

## 2025-03-05 ENCOUNTER — HOSPITAL ENCOUNTER (OUTPATIENT)
Dept: GENERAL RADIOLOGY | Facility: HOSPITAL | Age: 55
Discharge: HOME | End: 2025-03-05
Admitting: INTERNAL MEDICINE
Payer: COMMERCIAL

## 2025-03-05 VITALS
TEMPERATURE: 97.8 F | SYSTOLIC BLOOD PRESSURE: 130 MMHG | HEART RATE: 78 BPM | HEIGHT: 64 IN | OXYGEN SATURATION: 95 % | BODY MASS INDEX: 36.7 KG/M2 | WEIGHT: 215 LBS | DIASTOLIC BLOOD PRESSURE: 62 MMHG

## 2025-03-05 DIAGNOSIS — R20.2 PARESTHESIA OF BOTH LEGS: ICD-10-CM

## 2025-03-05 DIAGNOSIS — Z00.00 ANNUAL PHYSICAL EXAM: Primary | ICD-10-CM

## 2025-03-05 DIAGNOSIS — E78.2 MIXED HYPERLIPIDEMIA: ICD-10-CM

## 2025-03-05 DIAGNOSIS — I10 ESSENTIAL HYPERTENSION: ICD-10-CM

## 2025-03-05 PROCEDURE — 72110 X-RAY EXAM L-2 SPINE 4/>VWS: CPT

## 2025-03-05 PROCEDURE — 99396 PREV VISIT EST AGE 40-64: CPT | Performed by: INTERNAL MEDICINE

## 2025-03-05 NOTE — PROGRESS NOTES
[facility]     Blanka Carter  1970   [id number]      Patient Care Team:  Slava Hair MD as PCP - General  Slava Hair MD as PCP - Family Medicine    Chief Complaint::   Chief Complaint   Patient presents with    Annual Exam    Hypertension    Hyperlipidemia        HPI  History of Present Illness  The patient is a 54-year-old female who presents for an annual examination and follow-up of hypertension, hyperlipidemia, and obesity.    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 Her physical activity is limited to her work and walking her dog. She maintains a positive mood and does not endorse any other concerns or issues.    She expresses concern about her feet, questioning whether the discomfort is due to claudication or a pinched nerve in her back. Prolonged periods of sitting or standing, such as when scrubbing at a table, result in a burning sensation in the soles of her feet, followed by tingling. She does not experience any difficulty while walking her dog. Occasionally, she experiences numbness in her legs when sitting down, although she retains sensation.    She has been making efforts to lose weight gradually, focusing on maintaining a healthy diet 80 percent of the time with occasional cheat meals.    SOCIAL HISTORY  She does not smoke cigarettes.    FAMILY HISTORY  Her father had a lot of vascular disease.    MEDICATIONS  Metoprolol    Chronic Conditions:      Patient Active Problem List   Diagnosis    Disc disorder of cervical region    Obesity (BMI 35.0-39.9 without comorbidity)    Menopause    Allergic rhinitis    Ear pain, right    Annual physical exam    Bilateral hip pain    Current moderate episode of major depressive disorder without prior episode    Menopausal symptoms    Counseling for hormone replacement therapy    Essential hypertension    History of  hysterectomy, supracervical    Women's annual routine gynecological examination    KONSTANTIN (obstructive sleep apnea)    Surgical menopause    Tinea pedis of both feet    Right foot pain    Mixed hyperlipidemia    Dupuytren's contracture of hand    Arthritis of carpometacarpal (CMC) joint of thumb        Past Medical History:   Diagnosis Date    Allergic 1975    Seasonal    Cervical spondylosis     Herpes zoster without complication 2019    HSV infection     Hypertension 2020    Kidney stone 2015    Low back pain 2005    Im not sure of the date    Peripheral neuropathy     Right hand fingers get tingling and numb with excessive use    Seizure disorder 1990    resolved    Women's annual routine gynecological examination 2021       Past Surgical History:   Procedure Laterality Date     SECTION      COLONOSCOPY  2019    HYSTERECTOMY      [facility]SH    SUBTOTAL HYSTERECTOMY  2013    TUBAL ABDOMINAL LIGATION         Family History   Problem Relation Age of Onset    Diabetes Father         type 2    Heart disease Father     Osteoarthritis Mother     Arthritis Mother     Diabetes Maternal Grandmother     Breast cancer Neg Hx     Ovarian cancer Neg Hx        Social History     Socioeconomic History    Marital status:    Tobacco Use    Smoking status: Former     Current packs/day: 0.00     Average packs/day: 0.1 packs/day for 14.0 years (1.4 ttl pk-yrs)     Types: Cigarettes     Start date: 2003     Quit date: 2014     Years since quittin.1     Passive exposure: Past    Smokeless tobacco: Never    Tobacco comments:     2 cigarettes per day   Vaping Use    Vaping status: Never Used   Substance and Sexual Activity    Alcohol use: Not Currently     Alcohol/week: 2.0 standard drinks of alcohol     Comment: Monthly    Drug use: No    Sexual activity: Not Currently     Partners: Male     Birth control/protection: Post-menopausal, Tubal ligation, Hysterectomy       No Known  "Allergies      Current Outpatient Medications:     estrogens, conjugated, (Premarin) 0.625 MG tablet, Take 1 tablet by mouth Daily., Disp: 90 tablet, Rfl: 3    ibuprofen (ADVIL,MOTRIN) 800 MG tablet, Take 1 tablet by mouth Every 8 (Eight) Hours As Needed for Mild Pain., Disp: 90 tablet, Rfl: 1    metoprolol succinate XL (TOPROL-XL) 50 MG 24 hr tablet, Take 1 tablet by mouth once daily, Disp: 90 tablet, Rfl: 3    Pseudoephedrine-Ibuprofen (ADVIL COLD/SINUS PO), Take 1 tablet by mouth At Night As Needed., Disp: , Rfl:     valACYclovir (Valtrex) 1000 MG tablet, Take 1 tablet by mouth Daily., Disp: 90 tablet, Rfl: 3    Immunization History   Administered Date(s) Administered    COVID-19 (PFIZER) Purple Cap Monovalent 12/21/2020, 01/11/2021, 10/06/2021    Flu Vaccine Quad PF >36MO 10/24/2017    Fluzone Quad >6mos (Multi-dose) 10/14/2015, 10/01/2016, 11/02/2020    Hepatitis A 11/08/2018, 06/25/2019    Influenza, Unspecified 10/18/2018, 11/15/2021, 10/13/2022, 11/09/2023    Shingrix 12/18/2021, 03/31/2022    Tdap 07/17/2013, 06/14/2022        Health Maintenance Due   Topic Date Due    Pneumococcal Vaccine 50+ (1 of 1 - PCV) Never done    COVID-19 Vaccine (4 - 2024-25 season) 09/01/2024    ANNUAL PHYSICAL  03/04/2025        Review of Systems   Constitutional: Negative.    Respiratory: Negative.  Negative for chest tightness and shortness of breath.    Cardiovascular: Negative.  Negative for chest pain.   Gastrointestinal:  Negative for abdominal pain, blood in stool, constipation and diarrhea.        Vital Signs  Vitals:    03/05/25 1257   BP: 130/62   BP Location: Left arm   Patient Position: Sitting   Cuff Size: Adult   Pulse: 78   Temp: 97.8 °F (36.6 °C)   TempSrc: Infrared   SpO2: 95%   Weight: 97.5 kg (215 lb)   Height: 162.6 cm (64.02\")   PainSc: 3    PainLoc: Back  Comment: Hips and Back       Physical Exam  Vitals reviewed.   Constitutional:       Appearance: She is well-developed. She is obese.   HENT:      Head: " Normocephalic and atraumatic.      Right Ear: Tympanic membrane normal.      Left Ear: Tympanic membrane and ear canal normal.      Mouth/Throat:      Pharynx: Oropharynx is clear. No posterior oropharyngeal erythema.   Eyes:      Extraocular Movements: Extraocular movements intact.      Conjunctiva/sclera: Conjunctivae normal.      Pupils: Pupils are equal, round, and reactive to light.   Neck:      Thyroid: No thyromegaly.      Vascular: No carotid bruit.   Cardiovascular:      Rate and Rhythm: Normal rate and regular rhythm.      Heart sounds: Normal heart sounds. No murmur heard.     No friction rub. No gallop.   Pulmonary:      Effort: Pulmonary effort is normal.      Breath sounds: Normal breath sounds.   Abdominal:      General: Bowel sounds are normal.      Palpations: Abdomen is soft.      Tenderness: There is no abdominal tenderness.   Musculoskeletal:      Cervical back: Normal range of motion and neck supple.      Right lower leg: No edema.      Left lower leg: No edema.   Lymphadenopathy:      Cervical: No cervical adenopathy.   Skin:     General: Skin is warm and dry.   Neurological:      Mental Status: She is alert and oriented to person, place, and time.      Cranial Nerves: No cranial nerve deficit.   Psychiatric:         Mood and Affect: Mood normal.         Behavior: Behavior normal.        Physical Exam      Procedures     Fall Risk Screen:  [facility].    Health Habits and Functional and Cognitive Screening:       No data to display                Depression [place]  PHQ-9 Total Score:      ACE III MINI             Assessment/Plan:      Assessment & Plan  1. Health maintenance.  She remains in good health with good lifestyle habits. She is up to date on colorectal cancer screening, mammography, and GYN care.    2. Hypertension.  Blood pressure is well controlled on metoprolol.    3. Hyperlipidemia.  Continue healthy diet. Lipid panel is pending.    4.  Lower extremity paresthesias associated with prolonged standing.  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 There is no exertional component, so PAD is unlikely, although palpable pulses can not be felt in her feet. She will work on back exercises for both flexion and extension. A lumbar spine x-ray will be obtained. If this does not improve, lower extremity vascular studies would be considered.    Follow-up  The patient will follow up in 1 year.      Labs  Results      Counseling was given to patient for the following topics: appropriate exercise 150 minutes per week, disease prevention, and healthy eating habits.    Plan of care reviewed with patient at the conclusion of today's visit. Education was provided regarding diagnosis, management, and any prescribed or recommended OTC medications.Patient verbalizes understanding of and agreement with management plan.     Patient or patient representative verbalized consent for the use of Ambient Listening during the visit with  Slava Hair MD for chart documentation. 3/8/2025  18:19 EST        Slava Hair MD

## 2025-03-07 ENCOUNTER — LAB (OUTPATIENT)
Dept: LAB | Facility: HOSPITAL | Age: 55
End: 2025-03-07
Payer: COMMERCIAL

## 2025-03-07 DIAGNOSIS — E78.2 MIXED HYPERLIPIDEMIA: ICD-10-CM

## 2025-03-07 DIAGNOSIS — I10 ESSENTIAL HYPERTENSION: ICD-10-CM

## 2025-03-07 LAB
ALBUMIN SERPL-MCNC: 4 G/DL (ref 3.5–5.2)
ALBUMIN/GLOB SERPL: 1.2 G/DL
ALP SERPL-CCNC: 81 U/L (ref 39–117)
ALT SERPL W P-5'-P-CCNC: 13 U/L (ref 1–33)
ANION GAP SERPL CALCULATED.3IONS-SCNC: 9.1 MMOL/L (ref 5–15)
AST SERPL-CCNC: 14 U/L (ref 1–32)
BASOPHILS # BLD AUTO: 0.03 10*3/MM3 (ref 0–0.2)
BASOPHILS NFR BLD AUTO: 0.4 % (ref 0–1.5)
BILIRUB SERPL-MCNC: 0.3 MG/DL (ref 0–1.2)
BUN SERPL-MCNC: 15 MG/DL (ref 6–20)
BUN/CREAT SERPL: 18.1 (ref 7–25)
CALCIUM SPEC-SCNC: 9.8 MG/DL (ref 8.6–10.5)
CHLORIDE SERPL-SCNC: 98 MMOL/L (ref 98–107)
CHOLEST SERPL-MCNC: 187 MG/DL (ref 0–200)
CO2 SERPL-SCNC: 26.9 MMOL/L (ref 22–29)
CREAT SERPL-MCNC: 0.83 MG/DL (ref 0.57–1)
DEPRECATED RDW RBC AUTO: 46.2 FL (ref 37–54)
EGFRCR SERPLBLD CKD-EPI 2021: 82.9 ML/MIN/1.73
EOSINOPHIL # BLD AUTO: 0.15 10*3/MM3 (ref 0–0.4)
EOSINOPHIL NFR BLD AUTO: 1.9 % (ref 0.3–6.2)
ERYTHROCYTE [DISTWIDTH] IN BLOOD BY AUTOMATED COUNT: 12.9 % (ref 12.3–15.4)
GLOBULIN UR ELPH-MCNC: 3.3 GM/DL
GLUCOSE SERPL-MCNC: 94 MG/DL (ref 65–99)
HCT VFR BLD AUTO: 41.9 % (ref 34–46.6)
HCYS SERPL-MCNC: 8.6 UMOL/L (ref 0–15)
HDLC SERPL-MCNC: 54 MG/DL (ref 40–60)
HGB BLD-MCNC: 14 G/DL (ref 12–15.9)
IMM GRANULOCYTES # BLD AUTO: 0.02 10*3/MM3 (ref 0–0.05)
IMM GRANULOCYTES NFR BLD AUTO: 0.2 % (ref 0–0.5)
LDLC SERPL CALC-MCNC: 113 MG/DL (ref 0–100)
LDLC/HDLC SERPL: 2.04 {RATIO}
LYMPHOCYTES # BLD AUTO: 2.12 10*3/MM3 (ref 0.7–3.1)
LYMPHOCYTES NFR BLD AUTO: 26.3 % (ref 19.6–45.3)
MCH RBC QN AUTO: 31.9 PG (ref 26.6–33)
MCHC RBC AUTO-ENTMCNC: 33.4 G/DL (ref 31.5–35.7)
MCV RBC AUTO: 95.4 FL (ref 79–97)
MONOCYTES # BLD AUTO: 0.56 10*3/MM3 (ref 0.1–0.9)
MONOCYTES NFR BLD AUTO: 6.9 % (ref 5–12)
NEUTROPHILS NFR BLD AUTO: 5.18 10*3/MM3 (ref 1.7–7)
NEUTROPHILS NFR BLD AUTO: 64.3 % (ref 42.7–76)
NRBC BLD AUTO-RTO: 0 /100 WBC (ref 0–0.2)
PLATELET # BLD AUTO: 338 10*3/MM3 (ref 140–450)
PMV BLD AUTO: 9.5 FL (ref 6–12)
POTASSIUM SERPL-SCNC: 4.7 MMOL/L (ref 3.5–5.2)
PROT SERPL-MCNC: 7.3 G/DL (ref 6–8.5)
RBC # BLD AUTO: 4.39 10*6/MM3 (ref 3.77–5.28)
SODIUM SERPL-SCNC: 134 MMOL/L (ref 136–145)
TRIGL SERPL-MCNC: 114 MG/DL (ref 0–150)
VLDLC SERPL-MCNC: 20 MG/DL (ref 5–40)
WBC NRBC COR # BLD AUTO: 8.06 10*3/MM3 (ref 3.4–10.8)

## 2025-03-07 PROCEDURE — 83090 ASSAY OF HOMOCYSTEINE: CPT

## 2025-03-07 PROCEDURE — 80053 COMPREHEN METABOLIC PANEL: CPT

## 2025-03-07 PROCEDURE — 36415 COLL VENOUS BLD VENIPUNCTURE: CPT

## 2025-03-07 PROCEDURE — 82172 ASSAY OF APOLIPOPROTEIN: CPT

## 2025-03-07 PROCEDURE — 85025 COMPLETE CBC W/AUTO DIFF WBC: CPT

## 2025-03-07 PROCEDURE — 80061 LIPID PANEL: CPT

## 2025-03-09 LAB — APO B SERPL-MCNC: 103 MG/DL

## 2025-03-11 RX ORDER — IBUPROFEN 800 MG/1
800 TABLET, FILM COATED ORAL EVERY 8 HOURS PRN
Qty: 90 TABLET | Refills: 1 | Status: SHIPPED | OUTPATIENT
Start: 2025-03-11

## 2025-04-02 NOTE — PROGRESS NOTES
Subjective     Chief Complaint   Patient presents with    Gynecologic Exam     Annual refill Premarin and Valtrex       [place] Carter is a 55 y.o. year old  presenting to be seen for her annual exam.      She is sexually active.  In the past 12 months there have not been new sexual partners.  Condoms are not typically used.  She would not like to be screened for STD's at today's exam.     She exercises regularly: no.  She wears her seat belt: yes.  She has concerns about domestic violence: no.  She has noticed changes in height: no    GYN screening history:  Last pap: she reports her last PAP was normal  Last mammogram: she reports her last mammogram was normal  Last colonoscopy: she reports her last colonoscopy was normal  Last DEXA: she reports her last DEXA was normal.    Health Maintenance for Postmenopausal Women  Menopause is a normal process in which your ability to get pregnant comes to an end. This process happens slowly over many months or years, usually between the ages of 48 and 55. Menopause is complete when you have missed your menstrual period for 12 months.  It is important to talk with your health care provider about some of the most common conditions that affect women after menopause (postmenopausal women). These include heart disease, cancer, and bone loss (osteoporosis). Adopting a healthy lifestyle and getting preventive care can help to promote your health and wellness. The actions you take can also lower your chances of developing some of these common conditions.  What are the signs and symptoms of menopause?  During menopause, you may have the following symptoms:  Hot flashes. These can be moderate or severe.  Night sweats.  Decrease in sex drive.  Mood swings.  Headaches.  Tiredness (fatigue).  Irritability.  Memory problems.  Problems falling asleep or staying asleep.  Talk with your health care provider about treatment options for your symptoms.  Do I need hormone replacement  therapy?  Hormone replacement therapy is effective in treating symptoms that are caused by menopause, such as hot flashes and night sweats.  Hormone replacement carries certain risks, especially as you become older. If you are thinking about using estrogen or estrogen with progestin, discuss the benefits and risks with your health care provider.  How can I reduce my risk for heart disease and stroke?  The risk of heart disease, heart attack, and stroke increases as you age. One of the causes may be a change in the body's hormones during menopause. This can affect how your body uses dietary fats, triglycerides, and cholesterol. Heart attack and stroke are medical emergencies. There are many things that you can do to help prevent heart disease and stroke.  Watch your blood pressure  High blood pressure causes heart disease and increases the risk of stroke. This is more likely to develop in people who have high blood pressure readings or are overweight.  Have your blood pressure checked:  Every 3-5 years if you are 18-39 years of age.  Every year if you are 40 years old or older.  Eat a healthy diet  Eat a diet that includes plenty of vegetables, fruits, low-fat dairy products, and lean protein.  Do not eat a lot of foods that are high in solid fats, added sugars, or sodium.  Get regular exercise  Get regular exercise. This is one of the most important things you can do for your health. Most adults should:  Try to exercise for at least 150 minutes each week. The exercise should increase your heart rate and make you sweat (moderate-intensity exercise).  Try to do strengthening exercises at least twice each week. Do these in addition to the moderate-intensity exercise.  Spend less time sitting. Even light physical activity can be beneficial.  Other tips  Work with your health care provider to achieve or maintain a healthy weight.  Do not use any products that contain nicotine or tobacco. These products include  cigarettes, chewing tobacco, and vaping devices, such as e-cigarettes. If you need help quitting, ask your health care provider.  Know your numbers. Ask your health care provider to check your cholesterol and your blood sugar (glucose). Continue to have your blood tested as directed by your health care provider.  Do I need screening for cancer?  Depending on your health history and family history, you may need to have cancer screenings at different stages of your life. This may include screening for:  Breast cancer.  Cervical cancer.  Lung cancer.  Colorectal cancer.  What is my risk for osteoporosis?  After menopause, you may be at increased risk for osteoporosis. Osteoporosis is a condition in which bone destruction happens more quickly than new bone creation. To help prevent osteoporosis or the bone fractures that can happen because of osteoporosis, you may take the following actions:  If you are 19-50 years old, get at least 1,000 mg of calcium and at least 600 international units (IU) of vitamin D per day.  If you are older than age 50 but younger than age 70, get at least 1,200 mg of calcium and at least 600 international units (IU) of vitamin D per day.  If you are older than age 70, get at least 1,200 mg of calcium and at least 800 international units (IU) of vitamin D per day.  Smoking and drinking excessive alcohol increase the risk of osteoporosis. Eat foods that are rich in calcium and vitamin D, and do weight-bearing exercises several times each week as directed by your health care provider.  How does menopause affect my mental health?  Depression may occur at any age, but it is more common as you become older. Common symptoms of depression include:  Feeling depressed.  Changes in sleep patterns.  Changes in appetite or eating patterns.  Feeling an overall lack of motivation or enjoyment of activities that you previously enjoyed.  Frequent crying spells.  Talk with your health care provider if you think  that you are experiencing any of these symptoms.  General instructions  See your health care provider for regular wellness exams and vaccines. This may include:  Scheduling regular health, dental, and eye exams.  Getting and maintaining your vaccines. These include:  Influenza vaccine. Get this vaccine each year before the flu season begins.  Pneumonia vaccine.  Shingles vaccine.  Tetanus, diphtheria, and pertussis (Tdap) booster vaccine.  Your health care provider may also recommend other immunizations.  Tell your health care provider if you have ever been abused or do not feel safe at home.  Summary  Menopause is a normal process in which your ability to get pregnant comes to an end.  This condition causes hot flashes, night sweats, decreased interest in sex, mood swings, headaches, or lack of sleep.  Treatment for this condition may include hormone replacement therapy.  Take actions to keep yourself healthy, including exercising regularly, eating a healthy diet, watching your weight, and checking your blood pressure and blood sugar levels.  Get screened for cancer and depression. Make sure that you are up to date with all your vaccines.    No Additional Complaints Reported    The following portions of the patient's history were reviewed and updated as appropriate:vital signs and She  has a past medical history of Allergic (), Cervical spondylosis, Herpes zoster without complication (2019), HSV infection, Hypertension (2020), Kidney stone (), Low back pain (), Peripheral neuropathy (), Seizure disorder (), Surgical menopause (2023), and Women's annual routine gynecological examination (2021).  She does not have any pertinent problems on file.  She  has a past surgical history that includes Hysterectomy;  section; Tubal ligation; Subtotal Hysterectomy (); and Colonoscopy ().  Her family history includes Arthritis in her mother; Diabetes in her father and  "maternal grandmother; Heart disease in her father; Osteoarthritis in her mother.  She  reports that she quit smoking about 11 years ago. Her smoking use included cigarettes. She started smoking about 22 years ago. She has a 1.4 pack-year smoking history. She has been exposed to tobacco smoke. She has never used smokeless tobacco. She reports that she does not currently use alcohol after a past usage of about 2.0 standard drinks of alcohol per week. She reports that she does not use drugs.  Current Outpatient Medications   Medication Sig Dispense Refill    estrogens, conjugated, (Premarin) 0.625 MG tablet Take 1 tablet by mouth Daily. 90 tablet 3    ibuprofen (ADVIL,MOTRIN) 800 MG tablet Take 1 tablet by mouth Every 8 (Eight) Hours As Needed for Mild Pain. 90 tablet 1    metoprolol succinate XL (TOPROL-XL) 50 MG 24 hr tablet Take 1 tablet by mouth once daily 90 tablet 3    Pseudoephedrine-Ibuprofen (ADVIL COLD/SINUS PO) Take 1 tablet by mouth At Night As Needed.      valACYclovir (Valtrex) 1000 MG tablet Take 1 tablet by mouth Daily. 90 tablet 3     No current facility-administered medications for this visit.     Current Outpatient Medications on File Prior to Visit   Medication Sig    ibuprofen (ADVIL,MOTRIN) 800 MG tablet Take 1 tablet by mouth Every 8 (Eight) Hours As Needed for Mild Pain.    metoprolol succinate XL (TOPROL-XL) 50 MG 24 hr tablet Take 1 tablet by mouth once daily    Pseudoephedrine-Ibuprofen (ADVIL COLD/SINUS PO) Take 1 tablet by mouth At Night As Needed.    valACYclovir (Valtrex) 1000 MG tablet Take 1 tablet by mouth Daily.    [DISCONTINUED] estrogens, conjugated, (Premarin) 0.625 MG tablet Take 1 tablet by mouth Daily.     No current facility-administered medications on file prior to visit.     She has no known allergies..    Review of Systems  Pertinent items are noted in HPI.     Physical Exam    Objective     /82   Ht 162.6 cm (64.02\")   Wt 98.4 kg (217 lb)   LMP 12/01/2013 Comment: " hysterectomy  Breastfeeding No   BMI 37.23 kg/m²     General:  well developed; well nourished  no acute distress  mentation appropriate   Constitutional: healthy   Skin:  No suspicious lesions seen   Thyroid: normal to inspection and palpation   Lungs:  breathing is unlabored  clear to auscultation bilaterally   Heart:  regular rate and rhythm, S1, S2 normal, no murmur, click, rub or gallop   Breasts:  Examined in supine position  Symmetric without masses or skin dimpling  Nipples normal without inversion, lesions or discharge  There are no palpable axillary nodes   Abdomen: soft, non-tender; no masses  no umbilical or inginual hernias are present  no hepato-splenomegaly   Pelvis: Clinical staff was present for exam  External genitalia:  normal appearance of the external genitalia including Bartholin's and [place]'s glands.  :  urethral meatus normal; urethral hypermobility is absent.  Vaginal:  normal pink mucosa without prolapse or lesions.  Cervix:  normal appearance  Uterus:  absent  Adnexa:  normal bimanual exam of the adnexa.   Musculoskeletal: negative   Neuro: normal without focal findings, mental status, speech normal, alert and oriented x3, and LOBO   Psych: oriented to time, place and person, mood and affect are within normal limits, pt is a good historian; no memory problems were noted       Lab Review   CBC, CMP, PAP, and FLP    Imaging  DEXA  Mammogram report    Assessment & Plan     ASSESSMENT  1. Women's annual routine gynecological examination    2. History of hysterectomy, supracervical    3. Breast screening    4. Encounter for monitoring postmenopausal estrogen replacement therapy        PLAN  Orders Placed This Encounter   Procedures    Mammo Screening Digital Tomosynthesis Bilateral With CAD     Standing Status:   Future     Expected Date:   7/3/2025     Expiration Date:   4/3/2026     Reason for Exam::   screen     Release to patient:   Routine Release [[id number]]    Ambulatory Referral to  Dermatology     Referral Priority:   Routine     Referral Type:   Consultation     Referral Reason:   Specialty Services Required     Referred to Provider:   Rebecca Tirado MD     Requested Specialty:   Dermatology     Number of Visits Requested:   1     New Medications Ordered This Visit   Medications    estrogens, conjugated, (Premarin) 0.625 MG tablet     Sig: Take 1 tablet by mouth Daily.     Dispense:  90 tablet     Refill:  3         Follow up: 1 year(s)         This note was electronically signed.    Jack Rodney MD  April 3, 2025

## 2025-04-03 ENCOUNTER — OFFICE VISIT (OUTPATIENT)
Dept: OBSTETRICS AND GYNECOLOGY | Facility: CLINIC | Age: 55
End: 2025-04-03
Payer: COMMERCIAL

## 2025-04-03 VITALS
SYSTOLIC BLOOD PRESSURE: 122 MMHG | WEIGHT: 217 LBS | HEIGHT: 64 IN | DIASTOLIC BLOOD PRESSURE: 82 MMHG | BODY MASS INDEX: 37.05 KG/M2

## 2025-04-03 DIAGNOSIS — Z51.81 ENCOUNTER FOR MONITORING POSTMENOPAUSAL ESTROGEN REPLACEMENT THERAPY: ICD-10-CM

## 2025-04-03 DIAGNOSIS — Z12.39 BREAST SCREENING: ICD-10-CM

## 2025-04-03 DIAGNOSIS — Z79.890 ENCOUNTER FOR MONITORING POSTMENOPAUSAL ESTROGEN REPLACEMENT THERAPY: ICD-10-CM

## 2025-04-03 DIAGNOSIS — Z90.711 HISTORY OF HYSTERECTOMY, SUPRACERVICAL: ICD-10-CM

## 2025-04-03 DIAGNOSIS — Z01.419 WOMEN'S ANNUAL ROUTINE GYNECOLOGICAL EXAMINATION: Primary | ICD-10-CM

## 2025-04-03 PROBLEM — N95.1 MENOPAUSAL SYMPTOMS: Status: RESOLVED | Noted: 2019-09-30 | Resolved: 2025-04-03

## 2025-04-03 PROBLEM — Z78.0 MENOPAUSE: Status: RESOLVED | Noted: 2019-04-16 | Resolved: 2025-04-03

## 2025-04-03 PROBLEM — E89.40 SURGICAL MENOPAUSE: Status: RESOLVED | Noted: 2023-06-05 | Resolved: 2025-04-03

## 2025-04-03 PROBLEM — Z71.89 COUNSELING FOR HORMONE REPLACEMENT THERAPY: Status: RESOLVED | Noted: 2019-09-30 | Resolved: 2025-04-03

## 2025-04-03 PROCEDURE — 99396 PREV VISIT EST AGE 40-64: CPT | Performed by: OBSTETRICS & GYNECOLOGY

## 2025-04-07 ENCOUNTER — OFFICE VISIT (OUTPATIENT)
Dept: ORTHOPEDIC SURGERY | Facility: CLINIC | Age: 55
End: 2025-04-07
Payer: COMMERCIAL

## 2025-04-07 VITALS
HEIGHT: 64 IN | SYSTOLIC BLOOD PRESSURE: 160 MMHG | BODY MASS INDEX: 37.05 KG/M2 | WEIGHT: 217 LBS | DIASTOLIC BLOOD PRESSURE: 100 MMHG

## 2025-04-07 DIAGNOSIS — M18.12 ARTHRITIS OF CARPOMETACARPAL (CMC) JOINT OF LEFT THUMB: Primary | ICD-10-CM

## 2025-04-07 DIAGNOSIS — M72.0 DUPUYTREN'S CONTRACTURE OF HAND: ICD-10-CM

## 2025-04-07 RX ORDER — TRIAMCINOLONE ACETONIDE 40 MG/ML
0.5 INJECTION, SUSPENSION INTRA-ARTICULAR; INTRAMUSCULAR
Status: COMPLETED | OUTPATIENT
Start: 2025-04-07 | End: 2025-04-07

## 2025-04-07 RX ORDER — LIDOCAINE HYDROCHLORIDE 10 MG/ML
0.5 INJECTION, SOLUTION EPIDURAL; INFILTRATION; INTRACAUDAL; PERINEURAL
Status: COMPLETED | OUTPATIENT
Start: 2025-04-07 | End: 2025-04-07

## 2025-04-07 RX ADMIN — TRIAMCINOLONE ACETONIDE 0.5 MG: 40 INJECTION, SUSPENSION INTRA-ARTICULAR; INTRAMUSCULAR at 14:09

## 2025-04-07 RX ADMIN — LIDOCAINE HYDROCHLORIDE 0.5 ML: 10 INJECTION, SOLUTION EPIDURAL; INFILTRATION; INTRACAUDAL; PERINEURAL at 14:09

## 2025-04-07 NOTE — PROGRESS NOTES
Procedure   - Small Joint Arthrocentesis: L thumb CMC on 4/7/2025 2:09 PM  Indications: pain  Details: 27 G needle, dorsal approach  Medications: 0.5 mL lidocaine PF 1% 1 %; 0.5 mg triamcinolone acetonide 40 MG/ML  Outcome: tolerated well, no immediate complications  Procedure, treatment alternatives, risks and benefits explained, specific risks discussed. Consent was given by the patient. Immediately prior to procedure a time out was called to verify the correct patient, procedure, equipment, support staff and site/side marked as required. Patient was prepped and draped in the usual sterile fashion.

## 2025-04-07 NOTE — PROGRESS NOTES
"                                                                 [facility]     Office Visit       Date: 04/07/2025   Patient Name: Blanka Carter  MRN: [id number]  YOB: 1970    Referring Physician: No ref. provider found     Chief Complaint:   Chief Complaint   Patient presents with    Follow-up     4.5 month follow-up: Dupuytren's contracture of hand  (left)     History of Present Illness:   Blanka Carter is a 55 y.o. female right-hand-dominant presented to clinic for follow-up of left thumb CMC arthritis and left hand Dupuytren's nodule.  The patient's last clinic visit she received a thumb CMC injection.  She reports last injection improved her symptoms until proximately 1 week or so ago.  She reports that she is on vacation and seeing her thumb a lot with her granddaughter.  She has had increased pain since.  Pain is 4/10.  She is attempted wearing her brace but feels that this is irritating to the first webspace.  No change in her Dupuytren's.  No other complaints or concerns.    Subjective   Review of Systems:   Review of Systems   HENT: Negative.     Eyes: Negative.    Respiratory: Negative.     Cardiovascular: Negative.    Gastrointestinal: Negative.    Endocrine: Negative.    Genitourinary: Negative.    Musculoskeletal:  Positive for arthralgias.   Allergic/Immunologic: Negative.    Hematological: Negative.    Psychiatric/Behavioral: Negative.        Pertinent review of systems per HPI    I reviewed the patient's chief complaint, history of present illness, review of systems, past medical history, surgical history, family history, social history, medications and allergy list in the EMR on 04/07/2025 and agree with the findings above.    Objective    Vital Signs:   Vitals:    04/07/25 1401   BP: 160/100   Weight: 98.4 kg (217 lb)   Height: 162.6 cm (64\")     General: No acute distress. Alert and oriented.   Cardiovascular: Palpable radial pulse.   Respiratory: Breathing " is nonlabored.   Ortho Exam:  Examination of the left upper extremity:   No skin lesions or ecchymosis. No edema.   No thenar, hypothenar, or interosseous wasting appreciated   Tender to palpation over the thumb CMC joint   Nontender to palpation over the scaphoid  Full and painless active flexion/extension/pronosupination of the wrist  Able to make a composite fist  positive Thumb CMC joint grind   negative Thumb MCP joint hyper extension  Firm endpoint with radial/ulnar deviation of the thumb MCP at 0 and 30 degrees of flexion  negative Finkelstein's test  Median/radial/ulnar sensory intact to light touch  Digits are warm and well-perfused   there is a central cord noted in the palmar aspect of the left hand in line with the ring finger.  No change from prior exam.  No MCP or PIP joint contractures.    Imaging / Studies:    Imaging Results (Last 24 Hours)       ** No results found for the last 24 hours. **          Procedure Note:  After reviewing the risks, benefits and alternatives to a steroid injection, which include but are not limited to; hypopigmentation, fat necrosis/atrophy, pain, swelling, bleeding, bruising, damage to nearby nerves/vessels, allergic reaction , transient elevation in blood glucose levels and infection a verbal consent was obtained. A time-out was then performed and the affected hand was prepped with chlorhexadine soap and ethyl chloride was used to numb the skin. The left thumb CMC joint was injected with 0.5cc: 0.5cc mixture of Kenalog - 40 mg/ml and Lidocaine - 1% / 2 ml. The injection was well tolerated and a sterile dressing was applied. There were no complications. I advised the patient that they might experience some local discomfort for the next couple days and can apply ice to the site as needed.    Assessment / Plan    Assessment/Plan:   Blanka Carter is a 55 y.o. female with left thumb CMC arthritis and left hand Dupuytren's..     The patient did well after prior thumb CMC  injection until recently.  We discussed her options moving forward.  She was most interested in a repeat injection today.  This provided tolerated well.  I discussed that she should continue antiinflammatory use oral and topical as well as bracing as needed.  Will continue to monitor her to her her Dupuytren's cord.  She will see me back in 3 months for reevaluation.  No other complaints or concerns.      ICD-10-CM ICD-9-CM   1. Arthritis of carpometacarpal (CMC) joint of left thumb  M18.12 716.94   2. Dupuytren's contracture of hand  M72.0 728.6     Follow Up:   Return in about 3 months (around 7/7/2025) for Follow Up.      Lashaun Mark MD  [facility] [place] [facility]

## 2025-04-14 RX ORDER — IBUPROFEN 800 MG/1
800 TABLET, FILM COATED ORAL EVERY 8 HOURS PRN
Qty: 90 TABLET | Refills: 1 | Status: SHIPPED | OUTPATIENT
Start: 2025-04-14

## 2025-05-14 ENCOUNTER — HOSPITAL ENCOUNTER (OUTPATIENT)
Dept: MAMMOGRAPHY | Facility: HOSPITAL | Age: 55
Discharge: HOME | End: 2025-05-14
Admitting: OBSTETRICS & GYNECOLOGY
Payer: COMMERCIAL

## 2025-05-14 DIAGNOSIS — Z12.39 BREAST SCREENING: ICD-10-CM

## 2025-05-14 PROCEDURE — 77063 BREAST TOMOSYNTHESIS BI: CPT

## 2025-05-14 PROCEDURE — 77067 SCR MAMMO BI INCL CAD: CPT

## 2025-07-28 ENCOUNTER — OFFICE VISIT (OUTPATIENT)
Dept: ORTHOPEDIC SURGERY | Facility: CLINIC | Age: 55
End: 2025-07-28
Payer: COMMERCIAL

## 2025-07-28 VITALS
HEIGHT: 64 IN | BODY MASS INDEX: 35.68 KG/M2 | DIASTOLIC BLOOD PRESSURE: 84 MMHG | WEIGHT: 209 LBS | SYSTOLIC BLOOD PRESSURE: 128 MMHG

## 2025-07-28 DIAGNOSIS — M18.12 ARTHRITIS OF CARPOMETACARPAL (CMC) JOINT OF LEFT THUMB: Primary | ICD-10-CM

## 2025-07-28 RX ORDER — LIDOCAINE HYDROCHLORIDE 10 MG/ML
0.5 INJECTION, SOLUTION EPIDURAL; INFILTRATION; INTRACAUDAL; PERINEURAL
Status: COMPLETED | OUTPATIENT
Start: 2025-07-28 | End: 2025-07-28

## 2025-07-28 RX ORDER — DEXAMETHASONE SODIUM PHOSPHATE 4 MG/ML
2 INJECTION, SOLUTION INTRA-ARTICULAR; INTRALESIONAL; INTRAMUSCULAR; INTRAVENOUS; SOFT TISSUE
Status: COMPLETED | OUTPATIENT
Start: 2025-07-28 | End: 2025-07-28

## 2025-07-28 RX ADMIN — DEXAMETHASONE SODIUM PHOSPHATE 2 MG: 4 INJECTION, SOLUTION INTRA-ARTICULAR; INTRALESIONAL; INTRAMUSCULAR; INTRAVENOUS; SOFT TISSUE at 15:03

## 2025-07-28 RX ADMIN — LIDOCAINE HYDROCHLORIDE 0.5 ML: 10 INJECTION, SOLUTION EPIDURAL; INFILTRATION; INTRACAUDAL; PERINEURAL at 15:03

## 2025-07-28 NOTE — PROGRESS NOTES
Procedure   - Hand/Upper Extremity Injection: L thumb CMC for osteoarthritis on 7/28/2025 3:03 PM  Indications: pain  Details: 27 G needle, radial approach  Medications: 0.5 mL lidocaine PF 1% 1 %; 2 mg dexAMETHasone 4 MG/ML  Procedure, treatment alternatives, risks and benefits explained, specific risks discussed. Consent was given by the patient. Immediately prior to procedure a time out was called to verify the correct patient, procedure, equipment, support staff and site/side marked as required. Patient was prepped and draped in the usual sterile fashion.

## 2025-07-28 NOTE — PROGRESS NOTES
[facility]     Office Visit       Date: 07/28/2025   Patient Name: Blanka Carter  MRN: [id number]  YOB: 1970    Referring Physician: No ref. provider found     Chief Complaint:   Chief Complaint   Patient presents with    Follow-up     3.5 month follow up -left thumb CMC arthritis and left hand Dupuytren's..     History of Present Illness:   Blanka Carter is a 55 y.o. female right-hand-dominant presented clinic for follow-up of left thumb CMC arthritis and left hand Dupuytren's.  At the patient's last clinic visit she received a left thumb CMC injection.  She reports last injection lasted until proximately 1 week ago.  She has had increased left basilar thumb pain for the past 1 week.  Pain today is 5/10.  No injury or trauma.  No other complaints or concerns.    Subjective   Review of Systems:   Review of Systems   Constitutional:  Negative for chills, fever, unexpected weight gain and unexpected weight loss.   HENT:  Negative for congestion, postnasal drip and rhinorrhea.    Eyes:  Negative for blurred vision.   Respiratory:  Negative for shortness of breath.    Cardiovascular:  Negative for leg swelling.   Gastrointestinal:  Negative for abdominal pain, nausea and vomiting.   Genitourinary:  Negative for difficulty urinating.   Musculoskeletal:  Positive for arthralgias. Negative for gait problem, joint swelling and myalgias.   Skin:  Negative for skin lesions and wound.   Neurological:  Negative for dizziness, weakness, light-headedness and numbness.   Hematological:  Does not bruise/bleed easily.   Psychiatric/Behavioral:  Negative for depressed mood.       Pertinent review of systems per HPI    I reviewed the patient's chief complaint, history of present illness, review of systems, past medical history, surgical history, family history, social history, medications and allergy list in the EMR on 07/14/2025 and  "agree with the findings above.    Objective    Vital Signs:   Vitals:    07/28/25 1453   BP: 128/84   Weight: 94.8 kg (209 lb)   Height: 162.6 cm (64\")     General: No acute distress. Alert and oriented.   Cardiovascular: Palpable radial pulse.   Respiratory: Breathing is nonlabored.   Ortho Exam:  Examination of the left upper extremity:   No skin lesions or ecchymosis. No edema.   No thenar, hypothenar, or interosseous wasting appreciated   Tender to palpation over the thumb CMC joint   Nontender to palpation over the scaphoid  Full and painless active flexion/extension/pronosupination of the wrist  Able to make a composite fist  positive Thumb CMC joint grind   negative Thumb MCP joint hyper extension  Firm endpoint with radial/ulnar deviation of the thumb MCP at 0 and 30 degrees of flexion  negative Finkelstein's test  Median/radial/ulnar sensory intact to light touch  Digits are warm and well-perfused         Imaging / Studies:    Imaging Results (Last 24 Hours)       ** No results found for the last 24 hours. **          Procedure Note:  After reviewing the risks, benefits and alternatives to a steroid injection, which include but are not limited to; hypopigmentation, fat necrosis/atrophy, pain, swelling, bleeding, bruising, damage to nearby nerves/vessels, allergic reaction , transient elevation in blood glucose levels and infection a verbal consent was obtained. A time-out was then performed and the affected hand was prepped with chlorhexadine soap and ethyl chloride was used to numb the skin. The left thumb CMC joint was injected with 0.5cc: 0.5cc mixture of Lidocaine - 1% / 2 ml and dexamethasone 4 mg/mL. The injection was well tolerated and a sterile dressing was applied. There were no complications. I advised the patient that they might experience some local discomfort for the next couple days and can apply ice to the site as needed.    Assessment / Plan    Assessment/Plan:   Blanka Carter is a 55 " y.o. female with left thumb CMC arthritis and left hand Dupuytren's.    The patient has responded well to a left thumb CMC injection.  Her last injection lasted almost 3 months.  We discussed her options moving forward including oral and topical anti-inflammatories, brace wear, injection, and surgery.  The patient is most did repeat ejection today.  This was provided and tolerated well.  She return to clinic in 3 months with reevaluation with the PA.  The may consider repeat injection.  All questions and concerns were addressed.  She is agreeable.      ICD-10-CM ICD-9-CM   1. Arthritis of carpometacarpal (CMC) joint of left thumb  M18.12 716.94     Follow Up:   Return in about 3 months (around 10/28/2025) for Follow Up with Blanka.      Lashaun Mark MD  [facility] [place] [facility]

## 2025-08-13 DIAGNOSIS — M18.12 ARTHRITIS OF CARPOMETACARPAL (CMC) JOINT OF LEFT THUMB: Primary | ICD-10-CM

## 2025-08-13 RX ORDER — METHYLPREDNISOLONE 4 MG/1
1 TABLET ORAL DAILY
Qty: 21 TABLET | Refills: 0 | Status: SHIPPED | OUTPATIENT
Start: 2025-08-13